# Patient Record
Sex: MALE | Race: WHITE | Employment: FULL TIME | URBAN - METROPOLITAN AREA
[De-identification: names, ages, dates, MRNs, and addresses within clinical notes are randomized per-mention and may not be internally consistent; named-entity substitution may affect disease eponyms.]

---

## 2017-05-09 ENCOUNTER — RADIANT APPOINTMENT (OUTPATIENT)
Dept: GENERAL RADIOLOGY | Facility: CLINIC | Age: 32
End: 2017-05-09
Attending: NURSE PRACTITIONER
Payer: COMMERCIAL

## 2017-05-09 ENCOUNTER — OFFICE VISIT (OUTPATIENT)
Dept: PEDIATRICS | Facility: CLINIC | Age: 32
End: 2017-05-09
Payer: COMMERCIAL

## 2017-05-09 VITALS
HEIGHT: 71 IN | BODY MASS INDEX: 35.07 KG/M2 | DIASTOLIC BLOOD PRESSURE: 60 MMHG | HEART RATE: 80 BPM | SYSTOLIC BLOOD PRESSURE: 104 MMHG | OXYGEN SATURATION: 97 % | WEIGHT: 250.5 LBS | TEMPERATURE: 97.6 F | RESPIRATION RATE: 18 BRPM

## 2017-05-09 DIAGNOSIS — J20.9 ACUTE BRONCHITIS, UNSPECIFIED ORGANISM: Primary | ICD-10-CM

## 2017-05-09 DIAGNOSIS — J20.9 ACUTE BRONCHITIS, UNSPECIFIED ORGANISM: ICD-10-CM

## 2017-05-09 PROCEDURE — 71020 XR CHEST 2 VW: CPT

## 2017-05-09 PROCEDURE — 99214 OFFICE O/P EST MOD 30 MIN: CPT | Mod: 25 | Performed by: NURSE PRACTITIONER

## 2017-05-09 PROCEDURE — 94640 AIRWAY INHALATION TREATMENT: CPT | Performed by: NURSE PRACTITIONER

## 2017-05-09 RX ORDER — ALBUTEROL SULFATE 90 UG/1
2 AEROSOL, METERED RESPIRATORY (INHALATION) EVERY 6 HOURS PRN
Qty: 1 INHALER | Refills: 0 | Status: SHIPPED | OUTPATIENT
Start: 2017-05-09 | End: 2017-11-02

## 2017-05-09 RX ORDER — AZITHROMYCIN 250 MG/1
TABLET, FILM COATED ORAL
Qty: 6 TABLET | Refills: 0 | Status: SHIPPED | OUTPATIENT
Start: 2017-05-09 | End: 2017-11-02

## 2017-05-09 NOTE — NURSING NOTE
"Chief Complaint   Patient presents with     URI     cold worsening       Initial /60 (Cuff Size: Adult Large)  Pulse 80  Temp 97.6  F (36.4  C) (Tympanic)  Resp 18  Ht 5' 11\" (1.803 m)  Wt 250 lb 8 oz (113.6 kg)  SpO2 97%  BMI 34.94 kg/m2 Estimated body mass index is 34.94 kg/(m^2) as calculated from the following:    Height as of this encounter: 5' 11\" (1.803 m).    Weight as of this encounter: 250 lb 8 oz (113.6 kg).  Medication Reconciliation: complete   Amarilys Pichardo, ОЛЬГА    "

## 2017-05-09 NOTE — PROGRESS NOTES
"  SUBJECTIVE:                                                    Shawn Martini is a 31 year old male who presents to clinic today for the following health issues:    Acute Illness   Acute illness concerns: cough getting worse & worse  Onset: 12 days    Fever: no     Chills/Sweats: YES- chills \"clammy\"    Headache (location?): YES    Sinus Pressure:YES    Conjunctivitis:  no    Ear Pain: no    Rhinorrhea: no     Congestion: no     Sore Throat: YES- feels it is from cough     Cough: YES-productive of clear sputum    Wheeze: YES    Decreased Appetite: no     Nausea: YES- intermittent    Vomiting: no    Diarrhea:  YES- from amoxicillin    Dysuria/Freq.: no    Fatigue/Achiness: YES- fatigue    Sick/Strep Exposure: no  Chest Congestion  Difficulty sleeping   Therapies Tried and outcome: amoxicillin - on day 5, allegra, cough syrup: Symptoms not alleviated    Seen [facility]. Diagnosed with sinusitis.   Had asthma as a child.  No personal or family history of blood clots.   shortness of breath and wheezing are his worst sx right now.     ROS: const/heent/resp/cv otherwise negative     OBJECTIVE:  /60 (Cuff Size: Adult Large)  Pulse 80  Temp 97.6  F (36.4  C) (Tympanic)  Resp 18  Ht 5' 11\" (1.803 m)  Wt 250 lb 8 oz (113.6 kg)  SpO2 97%  BMI 34.94 kg/m2  CONSTITUTIONAL: Alert, well-nourished, well-groomed, NAD  RESP: Lungs CTA. No wheeze, rhonchi, rales.  CV: HRRR S1 S2 No MRG. No peripheral edema  HEENT: Eyes: Conjunctiva pink and moist. Ears: Ear canals unremarkable. TMs pearly gray bilaterally. Bony landmarks and light reflexes intact. No erythema. Nose: Turbinates pink and moist. Throat: OP pink and moist. No tonsillar enlargement or exudates. No postnasal drip.  Neck: No lymphadenopathy or masses. Thyroid smooth, non-tender, and non-enlarged.    Subjective sx improved dramatically after neb.     ASSESSMENT/PLAN:  (J20.9) Acute bronchitis, unspecified organism  (primary encounter " diagnosis)  Comment: Sx consistent with bronchitis. Given duration of sx would like to treat with Azithromycin.   Plan: XR Chest 2 Views, ALBUTEROL UNIT DOSE, 1 MG,         INHALATION/NEBULIZER TREATMENT, INITIAL,         albuterol (PROAIR HFA/PROVENTIL HFA/VENTOLIN         HFA) 108 (90 BASE) MCG/ACT Inhaler,         azithromycin (ZITHROMAX) 250 MG tablet,         acetaminophen-codeine 120-12 MG/5ML suspension        Continue allergy meds.        Albuterol q 6 until feeling better. Azithromycin. Codeine cough syrup at hs.         Discussed supportive cares and reasons to return. Discussed reasons to seek care urgently.       Keshia Nuñez, AGNIESZKAP-DNP.

## 2017-05-09 NOTE — NURSING NOTE
The following nebulizer treatment was given:     MEDICATION: Albuterol Sulfate 2.5 mg  : [employer]  LOT #: [id number]  EXPIRATION DATE:  8/30/18  NDC # 6661-1933-19     Amarilys Pichardo CMA

## 2017-05-09 NOTE — MR AVS SNAPSHOT
"              After Visit Summary   2017    Shawn Martini    MRN: [id number]           Patient Information     Date Of Birth          1985        Visit Information        Provider Department      2017 9:20 AM Keshia Nuñez APRN CNP [facility] [place]        Today's Diagnoses     Acute bronchitis, unspecified organism    -  1       Follow-ups after your visit        Who to contact     If you have questions or need follow up information about today's clinic visit or your schedule please contact [facility] directly at [phone].  Normal or non-critical lab and imaging results will be communicated to you by eFuneralhart, letter or phone within 4 business days after the clinic has received the results. If you do not hear from us within 7 days, please contact the clinic through eFuneralhart or phone. If you have a critical or abnormal lab result, we will notify you by phone as soon as possible.  Submit refill requests through [email or web address] or call your pharmacy and they will forward the refill request to us. Please allow 3 business days for your refill to be completed.          Additional Information About Your Visit        MyChart Information     [email or web address] lets you send messages to your doctor, view your test results, renew your prescriptions, schedule appointments and more. To sign up, go to [email or web address] . Click on \"Log in\" on the left side of the screen, which will take you to the Welcome page. Then click on \"Sign up Now\" on the right side of the page.     You will be asked to enter the access code listed below, as well as some personal information. Please follow the directions to create your username and password.     Your access code is: [email or web address]  Expires: 2017 10:20 AM     Your access code will  in 90 days. If you need help or a new code, please call your [facility] or [phone].        Care EveryWhere ID     This is your Care EveryWhere ID. This could be " "used by other organizations to access your [place] medical records  [id number]        Your Vitals Were     Pulse Temperature Respirations Height Pulse Oximetry BMI (Body Mass Index)    80 97.6  F (36.4  C) (Tympanic) 18 5' 11\" (1.803 m) 97% 34.94 kg/m2       Blood Pressure from Last 3 Encounters:   05/09/17 104/60   09/15/16 110/74   09/12/16 110/70    Weight from Last 3 Encounters:   05/09/17 250 lb 8 oz (113.6 kg)   09/15/16 252 lb (114.3 kg)   09/12/16 255 lb (115.7 kg)              We Performed the Following     ALBUTEROL UNIT DOSE, 1 MG     INHALATION/NEBULIZER TREATMENT, INITIAL          Today's Medication Changes          These changes are accurate as of: 5/9/17 10:20 AM.  If you have any questions, ask your nurse or doctor.               Start taking these medicines.        Dose/Directions    acetaminophen-codeine 120-12 MG/5ML suspension   Used for:  Acute bronchitis, unspecified organism   Started by:  Keshia Nuñez APRN CNP        Dose:  15 mL   Take 15 mLs by mouth every 4 hours as needed for pain   Quantity:  270 mL   Refills:  0       albuterol 108 (90 BASE) MCG/ACT Inhaler   Commonly known as:  PROAIR HFA/PROVENTIL HFA/VENTOLIN HFA   Used for:  Acute bronchitis, unspecified organism   Started by:  Keshia Nuñez APRN CNP        Dose:  2 puff   Inhale 2 puffs into the lungs every 6 hours as needed for shortness of breath / dyspnea or wheezing   Quantity:  1 Inhaler   Refills:  0       azithromycin 250 MG tablet   Commonly known as:  ZITHROMAX   Used for:  Acute bronchitis, unspecified organism   Started by:  Keshia Nuñez APRN CNP        Two tablets first day, then one tablet daily for four days.   Quantity:  6 tablet   Refills:  0            Where to get your medicines      These medications were sent to [facility] - [address] [place] [address] [address]     Phone:  [phone]     albuterol 108 " (90 BASE) MCG/ACT Inhaler    azithromycin 250 MG tablet         Some of these will need a paper prescription and others can be bought over the counter.  Ask your nurse if you have questions.     Bring a paper prescription for each of these medications     acetaminophen-codeine 120-12 MG/5ML suspension                Primary Care Provider    None       No address on file        Thank you!     Thank you for choosing [facility]  for your care. Our goal is always to provide you with excellent care. Hearing back from our patients is one way we can continue to improve our services. Please take a few minutes to complete the written survey that you may receive in the mail after your visit with us. Thank you!             Your Updated Medication List - Protect others around you: Learn how to safely use, store and throw away your medicines at www.disposemymeds.org.          This list is accurate as of: 5/9/17 10:20 AM.  Always use your most recent med list.                   Brand Name Dispense Instructions for use    acetaminophen-codeine 120-12 MG/5ML suspension     270 mL    Take 15 mLs by mouth every 4 hours as needed for pain       albuterol 108 (90 BASE) MCG/ACT Inhaler    PROAIR HFA/PROVENTIL HFA/VENTOLIN HFA    1 Inhaler    Inhale 2 puffs into the lungs every 6 hours as needed for shortness of breath / dyspnea or wheezing       azithromycin 250 MG tablet    ZITHROMAX    6 tablet    Two tablets first day, then one tablet daily for four days.

## 2017-11-02 ENCOUNTER — OFFICE VISIT (OUTPATIENT)
Dept: PEDIATRICS | Facility: CLINIC | Age: 32
End: 2017-11-02
Payer: COMMERCIAL

## 2017-11-02 VITALS
BODY MASS INDEX: 35.91 KG/M2 | HEIGHT: 71 IN | HEART RATE: 85 BPM | TEMPERATURE: 98.9 F | OXYGEN SATURATION: 99 % | WEIGHT: 256.5 LBS | SYSTOLIC BLOOD PRESSURE: 110 MMHG | DIASTOLIC BLOOD PRESSURE: 76 MMHG

## 2017-11-02 DIAGNOSIS — G44.209 TENSION HEADACHE: Primary | ICD-10-CM

## 2017-11-02 PROCEDURE — 99214 OFFICE O/P EST MOD 30 MIN: CPT | Mod: GC | Performed by: STUDENT IN AN ORGANIZED HEALTH CARE EDUCATION/TRAINING PROGRAM

## 2017-11-02 RX ORDER — CYCLOBENZAPRINE HCL 10 MG
10 TABLET ORAL AT BEDTIME
Qty: 10 TABLET | Refills: 0 | Status: SHIPPED | OUTPATIENT
Start: 2017-11-02 | End: 2017-11-12

## 2017-11-02 NOTE — PROGRESS NOTES
"  SUBJECTIVE:   Shawn Martini is a 32 year old male who presents to clinic today for the following health issues:      Headaches      Duration: 5 days, since Sunday. Friday night turned in his bed and felt like he \"tore a neck muscle\" or popped something in his neck. Had some soreness in his neck on Saturday, and then headache started on Sunday, at the back of his head near where he felt like he injured it on Friday.    Description  Location: bilateral in the occipital area   Character: sharp and throbbing pain  Frequency: nightly for last 5 nights  Duration: lasts all night and improves as he wakes up and becomes more active    Intensity:  5-6/10 at night time, better upon waking. Several hours after waking is 3/10 in severity.    Accompanying signs and symptoms: Mild sensitivity to light and sound, no other accompanying s/sx.     Precipitating or Alleviating factors:  Nausea/vomiting: no  Dizziness: no  Weakness or numbness: no  Visual changes: no  Fever: no   Sinus or URI symptoms no     History  Head trauma: no   Family history of migraines: no   Previous tests for headaches: had headache and ?fever May 2017, CT scan normal at [place]. Headache is different in quality this time.  Neurologist evaluations: no   Able to do daily activities when headache present: yes  Wake with headaches: yes, as the headaches last through the night  Daily pain medication use: yes, taking ibuprofen 800mg qHS, seems to help  Changes in: no changes in diet, caffeine intake, sleep pattern, or stressors    Precipitating or Alleviating factors (light/sound/sleep/caffeine): no    Therapies tried and outcome: Ibuprofen (Advil, Motrin) 800 mg qHS,  Outcome - effective. Warm pack on neck one time, not sure if helped.  Frequent/daily pain medication use: not in the past, only over the last 5 days. Does not taking other medications.    Problem list and histories reviewed & adjusted, as indicated.  Additional history: as " "documented    Patient Active Problem List   Diagnosis     CARDIOVASCULAR SCREENING; LDL GOAL LESS THAN 160     Past Surgical History:   Procedure Laterality Date     ORTHOPEDIC SURGERY Right 2003    Right knee, ACL/R     SURGICAL HISTORY OF -  Left 2003    Left wrist, ganglion cyst excision     WRIST SURGERY      cyst removal       Social History   Substance Use Topics     Smoking status: Never Smoker     Smokeless tobacco: Never Used     Alcohol use No      Comment: rare     Family History   Problem Relation Age of Onset     Family History Negative Mother      Family History Negative Father          Current Outpatient Prescriptions   Medication Sig Dispense Refill     cyclobenzaprine (FLEXERIL) 10 MG tablet Take 1 tablet (10 mg) by mouth At Bedtime for 10 doses 10 tablet 0     No Known Allergies  BP Readings from Last 3 Encounters:   11/02/17 110/76   05/09/17 104/60   09/15/16 110/74    Wt Readings from Last 3 Encounters:   11/02/17 256 lb 8 oz (116.3 kg)   05/09/17 250 lb 8 oz (113.6 kg)   09/15/16 252 lb (114.3 kg)                    ROS:  C: NEGATIVE for fever, chills, change in weight  I: NEGATIVE for worrisome rashes, moles or lesions  E: NEGATIVE for vision changes or irritation  E/M: NEGATIVE for ear, mouth and throat problems  R: NEGATIVE for significant cough or SOB  CV: NEGATIVE for chest pain, palpitations or peripheral edema  GI: NEGATIVE for nausea, abdominal pain, heartburn, or change in bowel habits  : NEGATIVE for frequency, dysuria, or hematuria  M: NEGATIVE for significant arthralgias or myalgia  N: NEGATIVE for weakness, dizziness or paresthesias, POSITIVE for headaches  E: NEGATIVE for temperature intolerance, skin/hair changes  H: NEGATIVE for bleeding problems  P: NEGATIVE for changes in mood or affect    OBJECTIVE:     /76 (BP Location: Right arm, Patient Position: Chair, Cuff Size: Adult Large)  Pulse 85  Temp 98.9  F (37.2  C) (Oral)  Ht 5' 11\" (1.803 m)  Wt 256 lb 8 oz (116.3 " kg)  SpO2 99%  BMI 35.77 kg/m2  Body mass index is 35.77 kg/(m^2).  Physical Exam    General: awake, alert, in no acute distress  HEENT: NCAT, PERRL, EOMI, sclera anicteric, no nasal discharge, MMM, posterior pharynx without erythema or exudates, no cervical lymphadenopathy  CV: RRR, no murmurs noted, peripheral pulses strong  Resp: CTAB, no increased WOB  Abd: Soft, nontender, nondistended, +BS, no rebound or guarding  MSK: No peripheral edema, extremities warm and well perfused, normal pulses  Skin: warm, dry, no jaundice  Neuro: CN II-XII grossly intact. 5/5 upper extremity and lower extremity strength. No numbness or changes in sensation. No focal deficits. Alert and oriented x4.    Diagnostic Test Results:  none     ASSESSMENT/PLAN:     1. Tension headache  Symptoms consistent with tension headache especially with history of preceding neck strain and better with activity throughout the day. No neurologic changes or other symptoms concerning for intracranial process including a mass or hemorrhage (no weakness, numbness, tingling, nausea, vomiting, vision changes). Normal head CT May 2017. Blood pressure normal. Less likely but could also consider pseudotumor cerebri given pain worse when laying down and patient overweight but less likely in male patient and does not have other s/sx consistent with this.   - Conservative management  - cyclobenzaprine (FLEXERIL) 10 MG tablet; Take 1 tablet (10 mg) by mouth At Bedtime for 10 doses  Dispense: 10 tablet; Refill: 0  - warm pack on neck  - Tylenol, ibuprofen for pain but try to limit use due to risk of rebound headache  - neck exercises on AVS  - RTC if not better over the weekend, will consider imaging. Given reasons to go to the ED.    MEDICATIONS:        - Start taking Flexeril  FUTURE APPOINTMENTS:       - Follow-up for annual visit or as needed if symptoms do not improve.    Patient was seen and discussed with attending, Dr. Keshia Larios, who agrees with the  above assessment and plan.    Coleen Calhoun MD  PGY - 2   Internal Medicine/Pediatrics  Pager [phone]  [facility]    Attestation:    I have seen patient and reviewed the documentation from Dr. Calhoun and discussed the findings with Dr. Calhoun. I agree with the documentation of Dr. Calhoun.    Keshia Larios MD  Internal Medicine/Pediatrics  [facility]

## 2017-11-02 NOTE — PATIENT INSTRUCTIONS
"1. Flexeril 10 mg nightly for muscle strain that may be causing your tension headache.  2. Continue warm pack on neck, might help with the strain.  3. Try taking Tylenol instead of ibuprofen or alternating these two to prevent rebound headache.  4. Gentle neck exercises.  5. If symptoms aren't better by Monday, would consider coming back to clinic and considering imaging. If you have changes in your symptoms or they are getting worse (fevers, vision changes, nausea/vomiting, weakness, numbness, difficulty speaking, etc) I suggest going to the ER.     * Tension Headache    Muscle Tension Headache (also called \"stress headache\") is a very common cause of head pain. Under stress, some people tense the muscles of their shoulder, neck and scalp without knowing it. If this lasts long enough, a headache can occur. These headaches can be very painful and last for hours or even days.  Home Care:    If you were given pain medicine for this headache, do not drive yourself home. Arrange for a ride, instead. When you get home, try to sleep. You should feel much better when you wake up.    Heat to the back of your neck may relieve neck spasm.    Drink only clear liquids or eat a very light diet to avoid nausea/vomiting until symptoms improve.  Preventing Future Headaches    Identify the sources of stress in your life. These may not be obvious! Learn new ways to handle your stress, such as regular exercise, biofeedback, self-hypnosis and meditation. For more information about this, consult your doctor or go to a local bookstore and review the many books and tapes on this subject.    At the first sign of a tension headache, take time out if possible. Remove yourself from the stressful situation, find a quiet comfortable place to sit or lie down and let yourself relax. Heat and deep massage of the tight areas in the neck and shoulders may help reduce muscle spasm. Medicine, such as ibuprofen (Advil or Motrin) or a prescribed muscle " relaxant may be helpful at this point.  Follow Up with your doctor if the headache is not better within the next 24 hours. If you have frequent headaches you should discuss a treatment plan with your primary care doctor. Ask if you can have medicine to take at home the next time you get a bad headache. This may avoid the need for a visit to the emergency department in the future. Poorly controlled chronic headaches may require a referral to a neurologist (headache specialist).  Call your Doctor Or Get Prompt Medical Attention if any of the following occur:    Worsening of your head pain or no improvement within 24 hours    Repeated vomiting (unable to keep liquids down)    Fever of 100.4 F (38.0 C) or higher, or as directed by your healthcare provider    Stiff neck    Extreme drowsiness, confusion or fainting    Dizziness, vertigo (dizziness with spinning sensation)    Weakness of an arm or leg or one side of the face    Difficulty with speech or vision    9374-8582 [employer]. [address] [place] [address]. All rights reserved. This information is not intended as a substitute for professional medical care. Always follow your healthcare professional's instructions.  This information has been modified by your health care provider with permission from the publisher.                      Neck Strain             What is neck strain?   A strain is a tear of a muscle or tendon. Your neck is surrounded by small muscles that are close to the vertebrae, and larger muscles that make up the visible muscles of the neck.   How does it occur?   Neck strains most often happen when the head and neck are forcibly moved, such as in a whiplash injury or from contact in sports. Sometimes strains happen from an awkward position during sleep or poor posture while working at a computer.   What are the symptoms?   Symptoms include pain in your neck. When the neck muscles go into spasm you feel hard, tight muscles  in your neck that are very tender to the touch. You have pain when you move your head to the side or when you try to move your head up or down. The spasming muscles can cause headaches.   The pain may start right after an injury or may take a few hours or days to develop. Other symptoms may include neck stiffness, dizziness, or unusual sensations, such as burning or a pins-and-needles feeling.   How is it diagnosed?   Your healthcare provider will examine your neck. You may have X-rays to make sure the vertebrae are not injured.   How is it treated?   Right after the injury, put an ice pack, gel pack, or package of frozen vegetables, wrapped in a cloth on the area every 3 to 4 hours, for up to 20 minutes at a time.   If you still have neck pain several days after the injury and after using ice, your healthcare provider may recommend using moist heat on your neck. You can buy a moist-heat pad or make your own by soaking towels in hot water. Put moist heat on your neck for up to 20 minutes at a time every 3 or 4 hours until the pain goes away. You may find that it helps to alternate putting heat and ice on your neck.   Your healthcare provider may prescribe an anti-inflammatory medicine and a neck collar to support your neck and prevent further injury. Nonsteroidal anti-inflammatory medicines (NSAIDs) may cause stomach bleeding and other problems. These risks increase with age. Read the label and take as directed. Unless recommended by your healthcare provider, do not take for more than 10 days.   Follow your provider's instructions for doing exercises to help you recover.   How long will the effects last?   How long it takes to recover depends on your age, health, and if you have had a previous neck injury. Recovery time also depends on the severity of the injury. A mild injury may recover within a few weeks, whereas a severe injury may take 6 weeks or longer to recover. Ask your healthcare provider when you can  return to your normal activities.   How can I prevent neck strain?   Neck strain is best prevented by having strong and supple neck muscles. If you have a job that requires you to be in one position all day (for example, work at a computer all day), it is very important to take breaks and stretch your neck muscles. Your provider will give you exercises to do while taking breaks from work.     Published by [employer].  This content is reviewed periodically and is subject to change as new health information becomes available. The information is intended to inform and educate and is not a replacement for medical evaluation, advice, diagnosis or treatment by a healthcare professional.   Written by Adiel Angulo MD, for [employer].   ? 2010 [employer] and/or its affiliates. All Rights Reserved.   Copyright   [employer] 2011  Adult Health Advisor                    Neck Strain Rehabilitation Exercises                Do these exercises only if you do not have pain or numbness running down your arm or into your hand. The first 6 exercises are meant to help your neck remain flexible. Do not do any exercises that make your neck pain worse.   Active neck rotation: Sit in a chair, keeping your neck, shoulders, and trunk straight. First, turn your head slowly to the right. Move it gently to the point of pain. Move it back to the forward position. Relax. Then move it to the left. Repeat 10 times.   Active neck side bend: Sit in a chair, keeping your neck, shoulders, and trunk straight. Tilt your head so that your right ear moves toward your right shoulder. Move it to the point of pain. Then tilt your head so your left ear moves toward your left shoulder. Make sure you do not rotate your head while tilting or raise your shoulder toward your head. Repeat this exercise 10 times in each direction.   Neck flexion: Sit in a chair, keeping your neck, shoulders, and trunk straight. Bend your head forward, reaching  your chin toward your chest. Hold for 5 seconds. Repeat 10 times.   Neck extension: Sit in a chair, keeping your neck, shoulders, and trunk straight. Bring your head back so that your chin is pointing toward the ceiling. Repeat 10 times.   Chin tuck: Place your fingertips on your chin and gently push your head straight back as if you are trying to make a double chin. Keep looking forward as your head moves back. Hold 5 seconds and repeat 5 times.   Scalene stretch: Sit or stand and clasp both hands behind your back. Lower your left shoulder and tilt your head toward the right until you feel a stretch. Hold this position for 15 to 30 seconds and then come back to the starting position. Then lower your right shoulder and tilt your head toward the left. Hold for 15 to 30 seconds. Repeat 3 times on each side.   Isometric neck flexion: Sit tall, eyes straight ahead, and chin level. Place your palm against your forehead and gently push your forehead into your palm. Hold for 5 seconds and release. Do 3 sets of 5.   Isometric neck extension: Sit tall, eyes straight ahead, and chin level. Clasp your hands together and place them behind your head. Press the back of your head into your palms. Hold 5 seconds and release. Do 3 sets of 5.   Isometric neck side bend: Sit tall, eyes straight ahead, and chin level. Place the palm of your hand at the side of your temple and press your temple into the palm of your hand. Hold 5 seconds and release. Do 3 sets of 5 on each side.   Head lift: Neck curl: Lie on your back with your knees bent and your feet flat on the floor. Tuck your chin and lift your head toward your chest, keeping your shoulders on the floor. Hold for 5 seconds. Repeat 10 times.   Head lift: Neck side bend: Lie on your right side with your right arm lying straight out. Rest your head on your arm, then lift your head slowly toward your left shoulder. Hold for 5 seconds. Repeat 10 times. Switch to your left side and  repeat the exercise, lifting your head toward your right shoulder.   Neck extension on hands and knees: Get on your hands and knees and look down at the floor. Keep your back straight and let your head slowly drop toward your chest. Then tuck your chin slightly and lift your head up until your neck is level with your back. Hold this position for 5 seconds. Repeat 10 times.   Scapular squeeze: While sitting or standing with your arms by your sides, squeeze your shoulder blades together and hold for 5 seconds. Do 3 sets of 10.   Published by [employer].  This content is reviewed periodically and is subject to change as new health information becomes available. The information is intended to inform and educate and is not a replacement for medical evaluation, advice, diagnosis or treatment by a healthcare professional.   Written by Sari Navarrete MS, PT, and Aga Stewart PT, [facility], [facility], for [employer].   ? 2010 [facility] and/or its affiliates. All Rights Reserved.   Copyright   [employer] 2011  Adult Health Advisor

## 2017-11-02 NOTE — MR AVS SNAPSHOT
"              After Visit Summary   11/2/2017    Shawn Martini    MRN: [id number]           Patient Information     Date Of Birth          1985        Visit Information        Provider Department      11/2/2017 8:30 AM [facility] Cal[facility]oun [facility]        Today's Diagnoses     Tension headache    -  1      Care Instructions    1. Flexeril 10 mg nightly for muscle strain that may be causing your tension headache.  2. Continue warm pack on neck, might help with the strain.  3. Try taking Tylenol instead of ibuprofen or alternating these two to prevent rebound headache.  4. Gentle neck exercises.  5. If symptoms aren't better by Monday, would consider coming back to clinic and considering imaging. If you have changes in your symptoms or they are getting worse (fevers, vision changes, nausea/vomiting, weakness, numbness, difficulty speaking, etc) I suggest going to the ER.     * Tension Headache    Muscle Tension Headache (also called \"stress headache\") is a very common cause of head pain. Under stress, some people tense the muscles of their shoulder, neck and scalp without knowing it. If this lasts long enough, a headache can occur. These headaches can be very painful and last for hours or even days.  Home Care:    If you were given pain medicine for this headache, do not drive yourself home. Arrange for a ride, instead. When you get home, try to sleep. You should feel much better when you wake up.    Heat to the back of your neck may relieve neck spasm.    Drink only clear liquids or eat a very light diet to avoid nausea/vomiting until symptoms improve.  Preventing Future Headaches    Identify the sources of stress in your life. These may not be obvious! Learn new ways to handle your stress, such as regular exercise, biofeedback, self-hypnosis and meditation. For more information about this, consult your doctor or go to a local bookstore and review the many books and tapes on this subject.    At " the first sign of a tension headache, take time out if possible. Remove yourself from the stressful situation, find a quiet comfortable place to sit or lie down and let yourself relax. Heat and deep massage of the tight areas in the neck and shoulders may help reduce muscle spasm. Medicine, such as ibuprofen (Advil or Motrin) or a prescribed muscle relaxant may be helpful at this point.  Follow Up with your doctor if the headache is not better within the next 24 hours. If you have frequent headaches you should discuss a treatment plan with your primary care doctor. Ask if you can have medicine to take at home the next time you get a bad headache. This may avoid the need for a visit to the emergency department in the future. Poorly controlled chronic headaches may require a referral to a neurologist (headache specialist).  Call your Doctor Or Get Prompt Medical Attention if any of the following occur:    Worsening of your head pain or no improvement within 24 hours    Repeated vomiting (unable to keep liquids down)    Fever of 100.4 F (38.0 C) or higher, or as directed by your healthcare provider    Stiff neck    Extreme drowsiness, confusion or fainting    Dizziness, vertigo (dizziness with spinning sensation)    Weakness of an arm or leg or one side of the face    Difficulty with speech or vision    2766-1602 [employer]. [address] [place] [address], [place] [id number]. All rights reserved. This information is not intended as a substitute for professional medical care. Always follow your healthcare professional's instructions.  This information has been modified by your health care provider with permission from the publisher.                      Neck Strain             What is neck strain?   A strain is a tear of a muscle or tendon. Your neck is surrounded by small muscles that are close to the vertebrae, and larger muscles that make up the visible muscles of the neck.   How does it occur?   Neck strains  most often happen when the head and neck are forcibly moved, such as in a whiplash injury or from contact in sports. Sometimes strains happen from an awkward position during sleep or poor posture while working at a computer.   What are the symptoms?   Symptoms include pain in your neck. When the neck muscles go into spasm you feel hard, tight muscles in your neck that are very tender to the touch. You have pain when you move your head to the side or when you try to move your head up or down. The spasming muscles can cause headaches.   The pain may start right after an injury or may take a few hours or days to develop. Other symptoms may include neck stiffness, dizziness, or unusual sensations, such as burning or a pins-and-needles feeling.   How is it diagnosed?   Your healthcare provider will examine your neck. You may have X-rays to make sure the vertebrae are not injured.   How is it treated?   Right after the injury, put an ice pack, gel pack, or package of frozen vegetables, wrapped in a cloth on the area every 3 to 4 hours, for up to 20 minutes at a time.   If you still have neck pain several days after the injury and after using ice, your healthcare provider may recommend using moist heat on your neck. You can buy a moist-heat pad or make your own by soaking towels in hot water. Put moist heat on your neck for up to 20 minutes at a time every 3 or 4 hours until the pain goes away. You may find that it helps to alternate putting heat and ice on your neck.   Your healthcare provider may prescribe an anti-inflammatory medicine and a neck collar to support your neck and prevent further injury. Nonsteroidal anti-inflammatory medicines (NSAIDs) may cause stomach bleeding and other problems. These risks increase with age. Read the label and take as directed. Unless recommended by your healthcare provider, do not take for more than 10 days.   Follow your provider's instructions for doing exercises to help you  recover.   How long will the effects last?   How long it takes to recover depends on your age, health, and if you have had a previous neck injury. Recovery time also depends on the severity of the injury. A mild injury may recover within a few weeks, whereas a severe injury may take 6 weeks or longer to recover. Ask your healthcare provider when you can return to your normal activities.   How can I prevent neck strain?   Neck strain is best prevented by having strong and supple neck muscles. If you have a job that requires you to be in one position all day (for example, work at a computer all day), it is very important to take breaks and stretch your neck muscles. Your provider will give you exercises to do while taking breaks from work.     Published by [employer].  This content is reviewed periodically and is subject to change as new health information becomes available. The information is intended to inform and educate and is not a replacement for medical evaluation, advice, diagnosis or treatment by a healthcare professional.   Written by Adiel Angulo MD, for [employer].   ? 2010 [employer] and/or its affiliates. All Rights Reserved.   Copyright   [employer] 2011  Adult Health Advisor                    Neck Strain Rehabilitation Exercises                Do these exercises only if you do not have pain or numbness running down your arm or into your hand. The first 6 exercises are meant to help your neck remain flexible. Do not do any exercises that make your neck pain worse.   Active neck rotation: Sit in a chair, keeping your neck, shoulders, and trunk straight. First, turn your head slowly to the right. Move it gently to the point of pain. Move it back to the forward position. Relax. Then move it to the left. Repeat 10 times.   Active neck side bend: Sit in a chair, keeping your neck, shoulders, and trunk straight. Tilt your head so that your right ear moves toward your right shoulder.  Move it to the point of pain. Then tilt your head so your left ear moves toward your left shoulder. Make sure you do not rotate your head while tilting or raise your shoulder toward your head. Repeat this exercise 10 times in each direction.   Neck flexion: Sit in a chair, keeping your neck, shoulders, and trunk straight. Bend your head forward, reaching your chin toward your chest. Hold for 5 seconds. Repeat 10 times.   Neck extension: Sit in a chair, keeping your neck, shoulders, and trunk straight. Bring your head back so that your chin is pointing toward the ceiling. Repeat 10 times.   Chin tuck: Place your fingertips on your chin and gently push your head straight back as if you are trying to make a double chin. Keep looking forward as your head moves back. Hold 5 seconds and repeat 5 times.   Scalene stretch: Sit or stand and clasp both hands behind your back. Lower your left shoulder and tilt your head toward the right until you feel a stretch. Hold this position for 15 to 30 seconds and then come back to the starting position. Then lower your right shoulder and tilt your head toward the left. Hold for 15 to 30 seconds. Repeat 3 times on each side.   Isometric neck flexion: Sit tall, eyes straight ahead, and chin level. Place your palm against your forehead and gently push your forehead into your palm. Hold for 5 seconds and release. Do 3 sets of 5.   Isometric neck extension: Sit tall, eyes straight ahead, and chin level. Clasp your hands together and place them behind your head. Press the back of your head into your palms. Hold 5 seconds and release. Do 3 sets of 5.   Isometric neck side bend: Sit tall, eyes straight ahead, and chin level. Place the palm of your hand at the side of your temple and press your temple into the palm of your hand. Hold 5 seconds and release. Do 3 sets of 5 on each side.   Head lift: Neck curl: Lie on your back with your knees bent and your feet flat on the floor. Tuck your chin  and lift your head toward your chest, keeping your shoulders on the floor. Hold for 5 seconds. Repeat 10 times.   Head lift: Neck side bend: Lie on your right side with your right arm lying straight out. Rest your head on your arm, then lift your head slowly toward your left shoulder. Hold for 5 seconds. Repeat 10 times. Switch to your left side and repeat the exercise, lifting your head toward your right shoulder.   Neck extension on hands and knees: Get on your hands and knees and look down at the floor. Keep your back straight and let your head slowly drop toward your chest. Then tuck your chin slightly and lift your head up until your neck is level with your back. Hold this position for 5 seconds. Repeat 10 times.   Scapular squeeze: While sitting or standing with your arms by your sides, squeeze your shoulder blades together and hold for 5 seconds. Do 3 sets of 10.   Published by [employer].  This content is reviewed periodically and is subject to change as new health information becomes available. The information is intended to inform and educate and is not a replacement for medical evaluation, advice, diagnosis or treatment by a healthcare professional.   Written by Sari Navarrete, MS, PT, and Aga Stewart, PT, [facility], \[facility] [place] [facility]\"", for [employer].   ? 2010 [employer] and/or its affiliates. All Rights Reserved.   Copyright   [employer] 2011  Adult Health Advisor                                 Follow-ups after your visit        Who to contact     If you have questions or need follow up information about today's clinic visit or your schedule please contact [facility] directly at [phone].  Normal or non-critical lab and imaging results will be communicated to you by MyChart, letter or phone within 4 business days after the clinic has received the results. If you do not hear from us within 7 days, please contact the clinic through MyChart or phone. If you have a critical or abnormal lab  "result, we will notify you by phone as soon as possible.  Submit refill requests through [employer] or call your pharmacy and they will forward the refill request to us. Please allow 3 business days for your refill to be completed.          Additional Information About Your Visit        [employer] Information     [employer] lets you send messages to your doctor, view your test results, renew your prescriptions, schedule appointments and more. To sign up, go to [email or web address] . Click on \"Log in\" on the left side of the screen, which will take you to the Welcome page. Then click on \"Sign up Now\" on the right side of the page.     You will be asked to enter the access code listed below, as well as some personal information. Please follow the directions to create your username and password.     Your access code is: [email or web address]  Expires: 2018  9:15 AM     Your access code will  in 90 days. If you need help or a new code, please call your [facility] clinic or [phone].        Care EveryWhere ID     This is your Care EveryWhere ID. This could be used by other organizations to access your [facility] medical records  [id number]        Your Vitals Were     Pulse Temperature Height Pulse Oximetry BMI (Body Mass Index)       85 98.9  F (37.2  C) (Oral) 5' 11\" (1.803 m) 99% 35.77 kg/m2        Blood Pressure from Last 3 Encounters:   17 110/76   17 104/60   09/15/16 110/74    Weight from Last 3 Encounters:   17 256 lb 8 oz (116.3 kg)   17 250 lb 8 oz (113.6 kg)   09/15/16 252 lb (114.3 kg)              Today, you had the following     No orders found for display         Today's Medication Changes          These changes are accurate as of: 17  9:15 AM.  If you have any questions, ask your nurse or doctor.               Start taking these medicines.        Dose/Directions    cyclobenzaprine 10 MG tablet   Commonly known as:  FLEXERIL   Used for:  Tension headache   Started by:  Brian Calhoun " MD Maribel        Dose:  10 mg   Take 1 tablet (10 mg) by mouth At Bedtime for 10 doses   Quantity:  10 tablet   Refills:  0            Where to get your medicines      These medications were sent to [facility] - [address]   [address], [place] [address]     Phone:  [phone]     cyclobenzaprine 10 MG tablet                Primary Care Provider Office Phone # Fax #    [facility] [phone]       [address]  [place] [address]        Equal Access to Services     [facility]: Hadii aad ku hadasho Soomaali, waaxda luqadaha, qaybta kaalmada adeegyada, waxay idiin hayaan kathie kuhn . So [facility] [phone].    ATENCIÓN: Si habla español, tiene a james disposición servicios gratuitos de asistencia lingüística. Llame al [phone].    We comply with applicable federal civil rights laws and [place] laws. We do not discriminate on the basis of race, color, national origin, age, disability, sex, sexual orientation, or gender identity.            Thank you!     Thank you for choosing [facility]  for your care. Our goal is always to provide you with excellent care. Hearing back from our patients is one way we can continue to improve our services. Please take a few minutes to complete the written survey that you may receive in the mail after your visit with us. Thank you!             Your Updated Medication List - Protect others around you: Learn how to safely use, store and throw away your medicines at www.disposemymeds.org.          This list is accurate as of: 11/2/17  9:15 AM.  Always use your most recent med list.                   Brand Name Dispense Instructions for use Diagnosis    cyclobenzaprine 10 MG tablet    FLEXERIL    10 tablet    Take 1 tablet (10 mg) by mouth At Bedtime for 10 doses    Tension headache

## 2017-11-02 NOTE — NURSING NOTE
"Chief Complaint   Patient presents with     Headache       Initial /76 (BP Location: Right arm, Patient Position: Chair, Cuff Size: Adult Large)  Pulse 85  Temp 98.9  F (37.2  C) (Oral)  Ht 5' 11\" (1.803 m)  Wt 256 lb 8 oz (116.3 kg)  SpO2 99%  BMI 35.77 kg/m2 Estimated body mass index is 35.77 kg/(m^2) as calculated from the following:    Height as of this encounter: 5' 11\" (1.803 m).    Weight as of this encounter: 256 lb 8 oz (116.3 kg).  Medication Reconciliation: complete   Anai Mitchell MA      "

## 2017-11-28 ENCOUNTER — OFFICE VISIT (OUTPATIENT)
Dept: PEDIATRICS | Facility: CLINIC | Age: 32
End: 2017-11-28
Payer: COMMERCIAL

## 2017-11-28 VITALS
HEART RATE: 84 BPM | BODY MASS INDEX: 36.15 KG/M2 | DIASTOLIC BLOOD PRESSURE: 80 MMHG | WEIGHT: 258.2 LBS | SYSTOLIC BLOOD PRESSURE: 102 MMHG | HEIGHT: 71 IN | TEMPERATURE: 98.1 F | OXYGEN SATURATION: 98 %

## 2017-11-28 DIAGNOSIS — G44.209 TENSION HEADACHE: Primary | ICD-10-CM

## 2017-11-28 PROCEDURE — 96372 THER/PROPH/DIAG INJ SC/IM: CPT | Performed by: PHYSICIAN ASSISTANT

## 2017-11-28 PROCEDURE — 99214 OFFICE O/P EST MOD 30 MIN: CPT | Mod: 25 | Performed by: PHYSICIAN ASSISTANT

## 2017-11-28 RX ORDER — KETOROLAC TROMETHAMINE 30 MG/ML
60 INJECTION, SOLUTION INTRAMUSCULAR; INTRAVENOUS ONCE
Qty: 2 ML | Refills: 0 | OUTPATIENT
Start: 2017-11-28 | End: 2017-11-28

## 2017-11-28 NOTE — NURSING NOTE
The following medication was given:     MEDICATION: Ketorolac Tromethamine 60MG/2ML (30 mg/mL) (Toradol)  ROUTE: IM  SITE: Ventrogluteal - Left  DOSE: 2 mL  LOT #: [id number]  :  [employer]  EXPIRATION DATE:  02/2018  NDC#: [id number]  //Arpita Long MA// November 28, 2017 12:21 PM

## 2017-11-28 NOTE — NURSING NOTE
"Chief Complaint   Patient presents with     Headache     Back Pain       Initial /80  Pulse 84  Temp 98.1  F (36.7  C) (Oral)  Ht 5' 11\" (1.803 m)  Wt 258 lb 3.2 oz (117.1 kg)  SpO2 98%  BMI 36.01 kg/m2 Estimated body mass index is 36.01 kg/(m^2) as calculated from the following:    Height as of this encounter: 5' 11\" (1.803 m).    Weight as of this encounter: 258 lb 3.2 oz (117.1 kg).  Medication Reconciliation: complete   Arpita Long MA// November 28, 2017 8:44 AM          "

## 2017-11-28 NOTE — PROGRESS NOTES
"  SUBJECTIVE:   Shawn Martini is a 32 year old male who presents to clinic today for the following health issues:    Headache  Onset: on going on and off worst in the last two days     Description:   Location: bilateral in the top area, bilateral in the occipital area   Character: dull pain, squeezing pain  Frequency:  Constant in the last 24 hrs  Usually comes and goes   Duration:  4-5 hrs usually  Constant currently     Intensity: severe; 6/10    Progression of Symptoms:  worsening    Accompanying Signs & Symptoms:  Stiff neck: no    Neck or upper back pain: no - just middle; Below shoulder blades  Ongoing x one week; worse in the last 2 days    Fever: no  Sinus pressure: no  Nausea or vomiting: no  Dizziness: no  Numbness: no  Weakness: no  Visual changes: no    History:   Head trauma: no  Family history of migraines: no  Previous tests for headaches: CT head in spring 2017  Neurologist evaluations: no  Able to do daily activities: YES--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  Wake with a headaches: YES- went to bed and woke up   Do headaches wake you up: YES  Daily pain medication use: no  Work/school stressors/changes: no    Precipitating factors:   Does light make it worse: being in the dark has help   Does sound make it worse: hard to say     Alleviating factors:  Does sleep help: no  Therapies Tried and outcome: took percocet last night, ibuprofen this morning     ROS:  C: NEGATIVE for fever, chills  E/M: NEGATIVE for ear, mouth and throat problems  R: NEGATIVE for significant cough or SOB  CV: NEGATIVE for chest pain, palpitations  GI: NEGATIVE for nausea, abdominal pain, heartburn, or change in bowel habits  NEURO: NEGATIVE for weakness, dizziness or paresthesias    OBJECTIVE:                                                    /80  Pulse 84  Temp 98.1  F (36.7  C) (Oral)  Ht 5' 11\" (1.803 m)  Wt 258 lb 3.2 oz (117.1 kg)  SpO2 98%  BMI 36.01 kg/m2  Body mass index is 36.01 kg/(m^2). "   GENERAL: alert, no distress  Eyes:  Eyes grossly normal to inspection, PERRL and conjunctivae and sclerae normal  HENT: Mouth- no ulcers, no lesions  NECK: posterior neck pain; full ROM and strength  RESP: lungs clear to auscultation - no rales, no rhonchi, no wheezes  CV: regular rates and rhythm, normal S1 S2, no S3 or S4 and no murmur, no click or rub  Neuro:  Normal strength and tone, mentation intact, speech normal and cranial nerves 2-12 intact    Diagnostic test results:  No results found for this or any previous visit (from the past 24 hour(s)).       ASSESSMENT/PLAN:                                                    (G44.209) Tension headache  (primary encounter diagnosis)  Comment: heat, gentle ROM, aleve twice daily. Call if symptoms persist or worsen.   Plan: ketorolac (TORADOL) 60 MG/2ML SOLN injection          See Patient Instructions    David Cisneros PA-C  [facility]

## 2017-11-28 NOTE — MR AVS SNAPSHOT
After Visit Summary   11/28/2017    Shawn Martini    MRN: [id number]           Patient Information     Date Of Birth          1985        Visit Information        Provider Department      11/28/2017 8:30 AM David Cisneros PA-C [facility]        Today's Diagnoses     Tension headache    -  1      Care Instructions                               Tension Headaches  Tension headaches cause a dull, steady pain on both sides of the head and in the neck and the back of the head. The eyes may also feel tired. Tension headaches can be triggered by lack of sleep, poor posture, eyestrain, stress, and other factors.          To help prevent tension headaches:    Make sure your work area is properly set up to help you avoid neck strain and eyestrain.    Make sure that your eyeglass prescription is current and is appropriate for the work you do.    Learn techniques for relaxing and reducing emotional stress. These include deep breathing, progressive relaxation, and biofeedback.    Maintain a regular exercise regimen under the guidance of a doctor. This can help keep your neck and back flexible, strong, and relaxed.  To relieve the pain:    Use moist heat to relax the muscles. Soak in a hot bath or wrap a warm, moist towel around your neck.    Brush your scalp lightly with a soft hairbrush.    Give yourself a massage. Knead the muscles running from your shoulders up the back of your skull.    Use an ice pack. Apply this directly to the place where you feel pain.    Rest. Sleeping often helps relieve headache pain.    Drink plenty of fluids. Dehydration is another trigger for headaches.    Neck Tension Rehabilitation Exercises   You may do all of these exercises right away but avoid any movements that increase your pain.     Neck rotation with flexion:   Right: Turn your head to the right and clasp your hands behind your head. Let the weight of your arms pull your chin to the right  side of your chest. Relax. Hold for a count of 15. Do this 3 times.   Left: Turn your head to the left and clasp your hands behind your head. Let the weight of your arms pull your chin to the left side of your chest. Relax. Hold for a count of 15. Do this 3 times.     Chin tuck: Place your fingertips on your chin and gently push your head straight back as if you are trying to make a double chin. Keep looking forward as your head moves back. Hold 5 seconds and repeat 5 times.     Scalene stretch: This stretches the neck muscles that attach to your ribs. Sitting in an upright position, clasp both hands behind your back, lower your left shoulder, and tilt your head toward the right. Hold this position for 15 to 30 seconds and then come back to the starting position. Lower your right shoulder and tilt your head toward the left until you feel a stretch. Hold for 15 to 30 seconds. Repeat 3 times on each side.     Neck rotation stretch   Right side: Rotate your neck by looking over your right shoulder. Lift your right hand and place your palm on the left side of your chin. Push your chin with your palm toward your right shoulder. Hold for a count of 10. Do this 3 times.   Left side: Rotate your neck by looking over your left shoulder. Lift your left hand and place your palm on the right side of your chin. Push your chin with your palm toward your left shoulder. Hold for a count of 10. Do this 3 times.     Scapular squeeze: While sitting or standing with your arms by your sides, squeeze your shoulder blades together and hold for 5 seconds. Do 3 sets of 10.     Thoracic extension: While sitting in a chair, clasp both arms behind your head. Gently arch backward and look up toward the ceiling. Repeat 10 times. Do this several times per day.                                 Follow-ups after your visit        Who to contact     If you have questions or need follow up information about today's clinic visit or your schedule please  "contact [facility] directly at [phone].  Normal or non-critical lab and imaging results will be communicated to you by MyChart, letter or phone within 4 business days after the clinic has received the results. If you do not hear from us within 7 days, please contact the clinic through MyChart or phone. If you have a critical or abnormal lab result, we will notify you by phone as soon as possible.  Submit refill requests through [employer] or call your pharmacy and they will forward the refill request to us. Please allow 3 business days for your refill to be completed.          Additional Information About Your Visit        [employer] Information     [employer] lets you send messages to your doctor, view your test results, renew your prescriptions, schedule appointments and more. To sign up, go to [email or web address] . Click on \"Log in\" on the left side of the screen, which will take you to the Welcome page. Then click on \"Sign up Now\" on the right side of the page.     You will be asked to enter the access code listed below, as well as some personal information. Please follow the directions to create your username and password.     Your access code is: [email or web address]  Expires: 2018  8:15 AM     Your access code will  in 90 days. If you need help or a new code, please call your [place] clinic or [phone].        Care EveryWhere ID     This is your Care EveryWhere ID. This could be used by other organizations to access your [place] medical records  [id number]        Your Vitals Were     Pulse Temperature Height Pulse Oximetry BMI (Body Mass Index)       84 98.1  F (36.7  C) (Oral) 5' 11\" (1.803 m) 98% 36.01 kg/m2        Blood Pressure from Last 3 Encounters:   17 102/80   17 110/76   17 104/60    Weight from Last 3 Encounters:   17 258 lb 3.2 oz (117.1 kg)   17 256 lb 8 oz (116.3 kg)   17 250 lb 8 oz (113.6 kg)              Today, you had the following  "    No orders found for display         Today's Medication Changes          These changes are accurate as of: 11/28/17  9:13 AM.  If you have any questions, ask your nurse or doctor.               Start taking these medicines.        Dose/Directions    ketorolac 60 MG/2ML Soln injection   Commonly known as:  TORADOL   Used for:  Tension headache   Started by:  David Cisneros PA-C        Dose:  60 mg   Inject 2 mLs (60 mg) into the muscle once for 1 dose   Quantity:  2 mL   Refills:  0            Where to get your medicines      Some of these will need a paper prescription and others can be bought over the counter.  Ask your nurse if you have questions.     You don't need a prescription for these medications     ketorolac 60 MG/2ML Soln injection                Primary Care Provider Office Phone # Fax #    [facility] Maribel Calhoun [phone]       [address]  [facility] [address]        Equal Access to Services     MARY DISLA : Hadii wesley tapiao Sojessica, waaxda luqadaha, qaybta kaalmada adeegyada, jamal kuhn . So [facility] [phone].    ATENCIÓN: Si habla español, tiene a james disposición servicios gratuitos de asistencia lingüística. Llame al [phone].    We comply with applicable federal civil rights laws and [place] laws. We do not discriminate on the basis of race, color, national origin, age, disability, sex, sexual orientation, or gender identity.            Thank you!     Thank you for choosing [facility]  for your care. Our goal is always to provide you with excellent care. Hearing back from our patients is one way we can continue to improve our services. Please take a few minutes to complete the written survey that you may receive in the mail after your visit with us. Thank you!             Your Updated Medication List - Protect others around you: Learn how to safely use, store and throw away your medicines at  www.disposemymeds.org.          This list is accurate as of: 11/28/17  9:13 AM.  Always use your most recent med list.                   Brand Name Dispense Instructions for use Diagnosis    ketorolac 60 MG/2ML Soln injection    TORADOL    2 mL    Inject 2 mLs (60 mg) into the muscle once for 1 dose    Tension headache

## 2017-11-28 NOTE — PATIENT INSTRUCTIONS
Tension Headaches  Tension headaches cause a dull, steady pain on both sides of the head and in the neck and the back of the head. The eyes may also feel tired. Tension headaches can be triggered by lack of sleep, poor posture, eyestrain, stress, and other factors.          To help prevent tension headaches:    Make sure your work area is properly set up to help you avoid neck strain and eyestrain.    Make sure that your eyeglass prescription is current and is appropriate for the work you do.    Learn techniques for relaxing and reducing emotional stress. These include deep breathing, progressive relaxation, and biofeedback.    Maintain a regular exercise regimen under the guidance of a doctor. This can help keep your neck and back flexible, strong, and relaxed.  To relieve the pain:    Use moist heat to relax the muscles. Soak in a hot bath or wrap a warm, moist towel around your neck.    Brush your scalp lightly with a soft hairbrush.    Give yourself a massage. Knead the muscles running from your shoulders up the back of your skull.    Use an ice pack. Apply this directly to the place where you feel pain.    Rest. Sleeping often helps relieve headache pain.    Drink plenty of fluids. Dehydration is another trigger for headaches.    Neck Tension Rehabilitation Exercises   You may do all of these exercises right away but avoid any movements that increase your pain.     Neck rotation with flexion:   Right: Turn your head to the right and clasp your hands behind your head. Let the weight of your arms pull your chin to the right side of your chest. Relax. Hold for a count of 15. Do this 3 times.   Left: Turn your head to the left and clasp your hands behind your head. Let the weight of your arms pull your chin to the left side of your chest. Relax. Hold for a count of 15. Do this 3 times.     Chin tuck: Place your fingertips on your chin and gently push your head straight back as if you are  trying to make a double chin. Keep looking forward as your head moves back. Hold 5 seconds and repeat 5 times.     Scalene stretch: This stretches the neck muscles that attach to your ribs. Sitting in an upright position, clasp both hands behind your back, lower your left shoulder, and tilt your head toward the right. Hold this position for 15 to 30 seconds and then come back to the starting position. Lower your right shoulder and tilt your head toward the left until you feel a stretch. Hold for 15 to 30 seconds. Repeat 3 times on each side.     Neck rotation stretch   Right side: Rotate your neck by looking over your right shoulder. Lift your right hand and place your palm on the left side of your chin. Push your chin with your palm toward your right shoulder. Hold for a count of 10. Do this 3 times.   Left side: Rotate your neck by looking over your left shoulder. Lift your left hand and place your palm on the right side of your chin. Push your chin with your palm toward your left shoulder. Hold for a count of 10. Do this 3 times.     Scapular squeeze: While sitting or standing with your arms by your sides, squeeze your shoulder blades together and hold for 5 seconds. Do 3 sets of 10.     Thoracic extension: While sitting in a chair, clasp both arms behind your head. Gently arch backward and look up toward the ceiling. Repeat 10 times. Do this several times per day.

## 2018-01-02 ENCOUNTER — OFFICE VISIT (OUTPATIENT)
Dept: PEDIATRICS | Facility: CLINIC | Age: 33
End: 2018-01-02
Payer: COMMERCIAL

## 2018-01-02 VITALS
HEIGHT: 71 IN | SYSTOLIC BLOOD PRESSURE: 110 MMHG | WEIGHT: 254.6 LBS | DIASTOLIC BLOOD PRESSURE: 76 MMHG | HEART RATE: 85 BPM | OXYGEN SATURATION: 97 % | BODY MASS INDEX: 35.64 KG/M2 | TEMPERATURE: 97.8 F

## 2018-01-02 DIAGNOSIS — J01.90 ACUTE SINUSITIS WITH SYMPTOMS > 10 DAYS: Primary | ICD-10-CM

## 2018-01-02 PROCEDURE — 99213 OFFICE O/P EST LOW 20 MIN: CPT | Performed by: PHYSICIAN ASSISTANT

## 2018-01-02 NOTE — PROGRESS NOTES
"  SUBJECTIVE:   Shawn Martini is a 32 year old male who presents to clinic today for the following health issues:    Acute Illness   Acute illness concerns: sinus  Onset: 5 weeks    Fever: no    Chills/Sweats: YES    Headache (location?): no    Sinus Pressure: yes--fullness and improving    Nasal     Conjunctivitis:  no    Ear Pain: no    Rhinorrhea: YES    PND present    Congestion: YES- nasal and chest    Sore Throat: YES- occasional      Cough: YES-productive of yellow sputum, barking only when laying down    Wheeze: no    Decreased Appetite: YES    Nausea: YES    Vomiting: no    Diarrhea:  YES- twice per day    Dysuria/Freq.: no    Fatigue/Achiness: no    Sick/Strep Exposure: no     Therapies Tried and outcome: mucinex    ROS:  ROS otherwise negative    OBJECTIVE:                                                    /76 (BP Location: Right arm, Cuff Size: Adult Large)  Pulse 85  Temp 97.8  F (36.6  C) (Oral)  Ht 5' 11\" (1.803 m)  Wt 254 lb 9.6 oz (115.5 kg)  SpO2 97%  BMI 35.51 kg/m2  Body mass index is 35.51 kg/(m^2).   GENERAL: alert, no distress  HENT: ear canals- normal; TMs- normal; Nose- normal; Mouth- no ulcers, no lesions; frontal sinus tenderness  NECK: no tenderness, no adenopathy  RESP: lungs clear to auscultation - no rales, no rhonchi, no wheezes  CV: regular rates and rhythm, normal S1 S2, no S3 or S4 and no murmur, no click or rub    Diagnostic test results:  No results found for this or any previous visit (from the past 24 hour(s)).       ASSESSMENT/PLAN:                                                    (J01.90) Acute sinusitis with symptoms > 10 days  (primary encounter diagnosis)  Comment: begin antibiotics as directed.   Plan: amoxicillin-clavulanate (AUGMENTIN) 875-125 MG         per tablet          See Patient Instructions    David Cisneros PA-C  [facility]  "

## 2018-01-02 NOTE — NURSING NOTE
"Chief Complaint   Patient presents with     Sinus Problem       Initial /76 (BP Location: Right arm, Cuff Size: Adult Large)  Pulse 85  Temp 97.8  F (36.6  C) (Oral)  Ht 5' 11\" (1.803 m)  Wt 254 lb 9.6 oz (115.5 kg)  SpO2 97%  BMI 35.51 kg/m2 Estimated body mass index is 35.51 kg/(m^2) as calculated from the following:    Height as of this encounter: 5' 11\" (1.803 m).    Weight as of this encounter: 254 lb 9.6 oz (115.5 kg).  Medication Reconciliation: complete   Ariel Porter CMA      "

## 2018-01-02 NOTE — MR AVS SNAPSHOT
"              After Visit Summary   2018    Shawn Martini    MRN: [id number]           Patient Information     Date Of Birth          1985        Visit Information        Provider Department      2018 8:10 AM David Cisneros PA-C [facility]        Today's Diagnoses     Acute sinusitis with symptoms > 10 days    -  1      Care Instructions    Begin antibiotics   Take with food            Follow-ups after your visit        Who to contact     If you have questions or need follow up information about today's clinic visit or your schedule please [facility] at [phone].  Normal or non-critical lab and imaging results will be communicated to you by [employer], letter or phone within 4 business days after the clinic has received the results. If you do not hear from us within 7 days, please contact the clinic through [employer] or phone. If you have a critical or abnormal lab result, we will notify you by phone as soon as possible.  Submit refill requests through [employer] or call your pharmacy and they will forward the refill request to us. Please allow 3 business days for your refill to be completed.          Additional Information About Your Visit        MyChart Information     [employer] lets you send messages to your doctor, view your test results, renew your prescriptions, schedule appointments and more. To sign up, go to [email or web address] . Click on \"Log in\" on the left side of the screen, which will take you to the Welcome page. Then click on \"Sign up Now\" on the right side of the page.     You will be asked to enter the access code listed below, as well as some personal information. Please follow the directions to create your username and password.     Your access code is: [email or web address]  Expires: 2018  8:15 AM     Your access code will  in 90 days. If you need help or a new code, please call your [facility] or [phone].      " "  Care EveryWhere ID     This is your Care EveryWhere ID. This could be used by other organizations to access your [place] medical records  [id number]        Your Vitals Were     Pulse Temperature Height Pulse Oximetry BMI (Body Mass Index)       85 97.8  F (36.6  C) (Oral) 5' 11\" (1.803 m) 97% 35.51 kg/m2        Blood Pressure from Last 3 Encounters:   01/02/18 110/76   11/28/17 102/80   11/02/17 110/76    Weight from Last 3 Encounters:   01/02/18 254 lb 9.6 oz (115.5 kg)   11/28/17 258 lb 3.2 oz (117.1 kg)   11/02/17 256 lb 8 oz (116.3 kg)              Today, you had the following     No orders found for display         Today's Medication Changes          These changes are accurate as of: 1/2/18  8:26 AM.  If you have any questions, ask your nurse or doctor.               Start taking these medicines.        Dose/Directions    amoxicillin-clavulanate 875-125 MG per tablet   Commonly known as:  AUGMENTIN   Used for:  Acute sinusitis with symptoms > 10 days   Started by:  David Cisneros PA-C        Dose:  1 tablet   Take 1 tablet by mouth 2 times daily   Quantity:  20 tablet   Refills:  0            Where to get your medicines      These medications were sent to [facility] - [address]   [address]  [address], [place] [address]     Phone:  [phone]     amoxicillin-clavulanate 875-125 MG per tablet                Primary Care Provider Office Phone # Fax #    [facility] Maribel Calhoun [phone]       [address]  [facility] [place] [address]        Equal Access to Services     ELIDIA DISLA AH: Steve Rudolph, wamoiz luindigo, qareannata kaalmada maureen, jamal escalante. So [facility] [phone].    ATENCIÓN: Si habla español, tiene a james disposición servicios gratuitos de asistencia lingüística. Llame al [phone].    We comply with applicable federal civil rights laws and " [place] laws. We do not discriminate on the basis of race, color, national origin, age, disability, sex, sexual orientation, or gender identity.            Thank you!     Thank you for choosing [facility]  for your care. Our goal is always to provide you with excellent care. Hearing back from our patients is one way we can continue to improve our services. Please take a few minutes to complete the written survey that you may receive in the mail after your visit with us. Thank you!             Your Updated Medication List - Protect others around you: Learn how to safely use, store and throw away your medicines at www.disposemymeds.org.          This list is accurate as of: 1/2/18  8:26 AM.  Always use your most recent med list.                   Brand Name Dispense Instructions for use Diagnosis    amoxicillin-clavulanate 875-125 MG per tablet    AUGMENTIN    20 tablet    Take 1 tablet by mouth 2 times daily    Acute sinusitis with symptoms > 10 days

## 2018-02-02 ENCOUNTER — OFFICE VISIT (OUTPATIENT)
Dept: PEDIATRICS | Facility: CLINIC | Age: 33
End: 2018-02-02
Payer: COMMERCIAL

## 2018-02-02 VITALS
OXYGEN SATURATION: 98 % | HEIGHT: 71 IN | DIASTOLIC BLOOD PRESSURE: 78 MMHG | SYSTOLIC BLOOD PRESSURE: 104 MMHG | WEIGHT: 254.7 LBS | BODY MASS INDEX: 35.66 KG/M2 | TEMPERATURE: 98.1 F | HEART RATE: 82 BPM

## 2018-02-02 DIAGNOSIS — J06.9 UPPER RESPIRATORY TRACT INFECTION, UNSPECIFIED TYPE: Primary | ICD-10-CM

## 2018-02-02 DIAGNOSIS — R07.0 THROAT PAIN: ICD-10-CM

## 2018-02-02 LAB
DEPRECATED S PYO AG THROAT QL EIA: NORMAL
SPECIMEN SOURCE: NORMAL

## 2018-02-02 PROCEDURE — 87880 STREP A ASSAY W/OPTIC: CPT | Performed by: INTERNAL MEDICINE

## 2018-02-02 PROCEDURE — 99213 OFFICE O/P EST LOW 20 MIN: CPT | Performed by: INTERNAL MEDICINE

## 2018-02-02 PROCEDURE — 87081 CULTURE SCREEN ONLY: CPT | Performed by: INTERNAL MEDICINE

## 2018-02-02 RX ORDER — AZITHROMYCIN 250 MG/1
TABLET, FILM COATED ORAL
Qty: 6 TABLET | Refills: 0 | Status: SHIPPED | OUTPATIENT
Start: 2018-02-02 | End: 2018-05-22

## 2018-02-02 NOTE — PATIENT INSTRUCTIONS
"Saline spray (nonmedicated salt water) in small squirt bottles can be used every hour or two during the day, as can humidifiers during the night.  Steam showers can help keep mucous loose.       Expectorants (like \"Mucinex\" or \"Robitussin\") may help, as can using cough supressants (like the \"DM\" in Mucinex DM and Robitussin DM).    Might benefit from antihistamines like Zyrtec (cetirizine) for relief of congestion.    Fill prescription for a zpack only if symptoms not improving in 5 days.   "

## 2018-02-02 NOTE — MR AVS SNAPSHOT
"              After Visit Summary   2/2/2018    Shawn Martini    MRN: [id number]           Patient Information     Date Of Birth          1985        Visit Information        Provider Department      2/2/2018 9:00 AM Nicolás Farah MD [facility]        Today's Diagnoses     Upper respiratory tract infection, unspecified type    -  1    Throat pain          Care Instructions    Saline spray (nonmedicated salt water) in small squirt bottles can be used every hour or two during the day, as can humidifiers during the night.  Steam showers can help keep mucous loose.       Expectorants (like \"Mucinex\" or \"Robitussin\") may help, as can using cough supressants (like the \"DM\" in Mucinex DM and Robitussin DM).    Might benefit from antihistamines like Zyrtec (cetirizine) for relief of congestion.    Fill prescription for a zpack only if symptoms not improving in 5 days.           Follow-ups after your visit        Who to contact     If you have questions or need follow up information about today's clinic visit or your schedule please contact [facility] at [phone].  Normal or non-critical lab and imaging results will be communicated to you by Kitehart, letter or phone within 4 business days after the clinic has received the results. If you do not hear from us within 7 days, please contact the clinic through Kitehart or phone. If you have a critical or abnormal lab result, we will notify you by phone as soon as possible.  Submit refill requests through [employer] or call your pharmacy and they will forward the refill request to us. Please allow 3 business days for your refill to be completed.          Additional Information About Your Visit        MyChart Information     [employer] lets you send messages to your doctor, view your test results, renew your prescriptions, schedule appointments and more. To sign up, go to [email or web address] . Click on \"Log in\" on the left side of the " "screen, which will take you to the Welcome page. Then click on \"Sign up Now\" on the right side of the page.     You will be asked to enter the access code listed below, as well as some personal information. Please follow the directions to create your username and password.     Your access code is: [email or web address]  Expires: 5/3/2018  9:45 AM     Your access code will  in 90 days. If you need help or a new code, please call your [facility] or [phone].        Care EveryWhere ID     This is your Care EveryWhere ID. This could be used by other organizations to access your [facility] medical records  [id number]        Your Vitals Were     Pulse Temperature Height Pulse Oximetry BMI (Body Mass Index)       82 98.1  F (36.7  C) (Oral) 5' 11\" (1.803 m) 98% 35.52 kg/m2        Blood Pressure from Last 3 Encounters:   18 104/78   18 110/76   17 102/80    Weight from Last 3 Encounters:   18 254 lb 11.2 oz (115.5 kg)   18 254 lb 9.6 oz (115.5 kg)   17 258 lb 3.2 oz (117.1 kg)              We Performed the Following     Strep, Rapid Screen          Today's Medication Changes          These changes are accurate as of 18  9:45 AM.  If you have any questions, ask your nurse or doctor.               Start taking these medicines.        Dose/Directions    azithromycin 250 MG tablet   Commonly known as:  ZITHROMAX   Used for:  Upper respiratory tract infection, unspecified type   Started by:  Nicolás Farah MD        Two tablets first day, then one tablet daily for four days.   Quantity:  6 tablet   Refills:  0            Where to get your medicines      Some of these will need a paper prescription and others can be bought over the counter.  Ask your nurse if you have questions.     Bring a paper prescription for each of these medications     azithromycin 250 MG tablet                Primary Care Provider Office Phone # Fax #    Brian Maribel Calhoun [phone]       [facility] " [address]  [facility] [address]        Equal Access to Services     BGELIDIA NAIF : Hadii aad ku hadkatherinrobert Soarmandoali, wajoseda luqadaha, qaybta kaalmada kathiecurtissanjeev, jamal benoit ivannaarlene ambrosiomaria eugeniatoo escalante. So [facility] [phone].    ATENCIÓN: Si habla español, tiene a james disposición servicios gratuitos de asistencia lingüística. Llame al [phone].    We comply with applicable federal civil rights laws and [place] laws. We do not discriminate on the basis of race, color, national origin, age, disability, sex, sexual orientation, or gender identity.            Thank you!     Thank you for choosing [facility]  for your care. Our goal is always to provide you with excellent care. Hearing back from our patients is one way we can continue to improve our services. Please take a few minutes to complete the written survey that you may receive in the mail after your visit with us. Thank you!             Your Updated Medication List - Protect others around you: Learn how to safely use, store and throw away your medicines at www.disposemymeds.org.          This list is accurate as of 2/2/18  9:45 AM.  Always use your most recent med list.                   Brand Name Dispense Instructions for use Diagnosis    amoxicillin-clavulanate 875-125 MG per tablet    AUGMENTIN    20 tablet    Take 1 tablet by mouth 2 times daily    Acute sinusitis with symptoms > 10 days       azithromycin 250 MG tablet    ZITHROMAX    6 tablet    Two tablets first day, then one tablet daily for four days.    Upper respiratory tract infection, unspecified type

## 2018-02-02 NOTE — PROGRESS NOTES
SUBJECTIVE:   Shawn Martini is a 32 year old male who presents to clinic today for the following health issues:      RESPIRATORY SYMPTOMS      Duration: started 6-7 days ago, worsened last night    Description  nasal congestion, rhinorrhea, sore throat, facial pain/pressure, cough and hoarse voice    Severity: moderate    Accompanying signs and symptoms: Productive cough, yellow phlegm    History (predisposing factors):  Strep and flu exposure    Precipitating or alleviating factors: Mucinex, dayquil    Therapies tried and outcome:  Mucinex, dayquil, help with nasal pressure for about an hour then relief is gone. Steam shower, helpful    Feels like may have a sinus infection.  Began about 7 days ago--congestion of nose, headache; Very bad sore throat last night.  Had a cup of coffee this AM and feels better.      Has history of strep many times per year.     No documented fevers.       No flu shot.  Nonsmoker.       Problem list and histories reviewed & adjusted, as indicated.  Additional history: as documented    Patient Active Problem List   Diagnosis   (none) - all problems resolved or deleted     Past Surgical History:   Procedure Laterality Date     ORTHOPEDIC SURGERY Right 2003    Right knee, ACL/R     SURGICAL HISTORY OF -  Left 2003    Left wrist, ganglion cyst excision     WRIST SURGERY      cyst removal       Social History   Substance Use Topics     Smoking status: Never Smoker     Smokeless tobacco: Never Used     Alcohol use No      Comment: rare     Family History   Problem Relation Age of Onset     Family History Negative Mother      Family History Negative Father          Current Outpatient Prescriptions   Medication Sig Dispense Refill     amoxicillin-clavulanate (AUGMENTIN) 875-125 MG per tablet Take 1 tablet by mouth 2 times daily (Patient not taking: Reported on 2/2/2018) 20 tablet 0     No Known Allergies  BP Readings from Last 3 Encounters:   02/02/18 104/78   01/02/18 110/76   11/28/17  "102/80    Wt Readings from Last 3 Encounters:   02/02/18 254 lb 11.2 oz (115.5 kg)   01/02/18 254 lb 9.6 oz (115.5 kg)   11/28/17 258 lb 3.2 oz (117.1 kg)                  Labs reviewed in EPIC    Reviewed and updated as needed this visit by clinical staff       Reviewed and updated as needed this visit by Provider         ROS:  C: NEGATIVE for fever, chills, change in weight  E/M: NEGATIVE for ear, mouth and throat problems  R: NEGATIVE for significant cough or SOB  CV: NEGATIVE for chest pain, palpitations or peripheral edema    OBJECTIVE:                                                    /78 (BP Location: Right arm, Patient Position: Chair, Cuff Size: Adult Large)  Pulse 82  Temp 98.1  F (36.7  C) (Oral)  Ht 5' 11\" (1.803 m)  Wt 254 lb 11.2 oz (115.5 kg)  SpO2 98%  BMI 35.52 kg/m2  Body mass index is 35.52 kg/(m^2).   GENERAL: healthy, alert, well nourished, well hydrated, no distress  HENT: ear canals- normal; TMs- normal; Nose- normal; Mouth- no ulcers, no lesions  NECK: no tenderness, no adenopathy, no asymmetry, no masses, no stiffness; thyroid- normal to palpation  RESP: lungs clear to auscultation - no rales, no rhonchi, no wheezes  CV: regular rates and rhythm, normal S1 S2, no S3 or S4 and no murmur, no click or rub -  ABDOMEN: soft, no tenderness, no  hepatosplenomegaly, no masses, normal bowel sounds    Diagnostic test results:  Diagnostic Test Results:  Strep: negative      ASSESSMENT/PLAN:                                                    Upper respiratory infection:    Saline spray (nonmedicated salt water) in small squirt bottles can be used every hour or two during the day, as can humidifiers during the night.  Steam showers can help keep mucous loose.       Expectorants (like \"Mucinex\" or \"Robitussin\") may help, as can using cough supressants (like the \"DM\" in Mucinex DM and Robitussin DM).    Might benefit from antihistamines like Zyrtec (cetirizine) for relief of congestion.     If " symptoms worsen, patient is going to [place] in 1 week; I told him we could try a zpack but only if symptoms not improving over the next 5-7 days with above conservative management.     See Patient Instructions    Nicolás Farah MD  [facility]

## 2018-02-02 NOTE — NURSING NOTE
"Chief Complaint   Patient presents with     URI     Sinus infection, sore throat       Initial /78 (BP Location: Right arm, Patient Position: Chair, Cuff Size: Adult Large)  Pulse 82  Temp 98.1  F (36.7  C) (Oral)  Ht 5' 11\" (1.803 m)  Wt 254 lb 11.2 oz (115.5 kg)  SpO2 98%  BMI 35.52 kg/m2 Estimated body mass index is 35.52 kg/(m^2) as calculated from the following:    Height as of this encounter: 5' 11\" (1.803 m).    Weight as of this encounter: 254 lb 11.2 oz (115.5 kg).  Medication Reconciliation: complete   Samanta CAMARENA      "

## 2018-02-03 LAB
BACTERIA SPEC CULT: NORMAL
SPECIMEN SOURCE: NORMAL

## 2018-05-22 ENCOUNTER — OFFICE VISIT (OUTPATIENT)
Dept: FAMILY MEDICINE | Facility: CLINIC | Age: 33
End: 2018-05-22
Payer: COMMERCIAL

## 2018-05-22 VITALS
DIASTOLIC BLOOD PRESSURE: 80 MMHG | SYSTOLIC BLOOD PRESSURE: 120 MMHG | TEMPERATURE: 98.4 F | OXYGEN SATURATION: 95 % | BODY MASS INDEX: 36.26 KG/M2 | HEART RATE: 95 BPM | WEIGHT: 260 LBS | RESPIRATION RATE: 16 BRPM

## 2018-05-22 DIAGNOSIS — J18.9 PNEUMONIA DUE TO INFECTIOUS ORGANISM, UNSPECIFIED LATERALITY, UNSPECIFIED PART OF LUNG: Primary | ICD-10-CM

## 2018-05-22 PROCEDURE — 99214 OFFICE O/P EST MOD 30 MIN: CPT | Performed by: NURSE PRACTITIONER

## 2018-05-22 RX ORDER — LEVOFLOXACIN 500 MG/1
500 TABLET, FILM COATED ORAL DAILY
Qty: 7 TABLET | Refills: 0 | Status: SHIPPED | OUTPATIENT
Start: 2018-05-22 | End: 2019-11-19

## 2018-05-22 RX ORDER — PREDNISONE 20 MG/1
20 TABLET ORAL DAILY
Qty: 5 TABLET | Refills: 0 | Status: SHIPPED | OUTPATIENT
Start: 2018-05-22 | End: 2019-11-19

## 2018-05-22 RX ORDER — CODEINE PHOSPHATE AND GUAIFENESIN 10; 100 MG/5ML; MG/5ML
1-2 SOLUTION ORAL EVERY 6 HOURS PRN
Qty: 120 ML | Refills: 0 | Status: SHIPPED | OUTPATIENT
Start: 2018-05-22 | End: 2019-11-19

## 2018-05-22 NOTE — MR AVS SNAPSHOT
After Visit Summary   5/22/2018    Shawn Martini    MRN: [id number]           Patient Information     Date Of Birth          1985        Visit Information        Provider Department      5/22/2018 9:20 AM Nelia Daugherty APRN [facility]        Today's Diagnoses     Pneumonia due to infectious organism, unspecified laterality, unspecified part of lung    -  1      Care Instructions      Pneumonia (Adult)  Pneumonia is an infection deep within the lungs. It is in the small air sacs (alveoli). Pneumonia may be caused by a virus or bacteria. Pneumonia caused by bacteria is usually treated with an antibiotic. Severe cases may need to be treated in the hospital. Milder cases can be treated at home. Symptoms usually start to get better during the first 2 days of treatment.    Home care  Follow these guidelines when caring for yourself at home:    Rest at home for the first 2 to 3 days, or until you feel stronger. Don t let yourself get overly tired when you go back to your activities.    Stay away from cigarette smoke - yours or other people s.    You may use acetaminophen or ibuprofen to control fever or pain, unless another medicine was prescribed. If you have chronic liver or kidney disease, talk with your healthcare provider before using these medicines. Also talk with your provider if you ve had a stomach ulcer or gastrointestinal bleeding. Don t give aspirin to anyone younger than 18 years of age who is ill with a fever. It may cause severe liver damage.    Your appetite may be poor, so a light diet is fine.    Drink 6 to 8 glasses of fluids every day to make sure you are getting enough fluids. Beverages can include water, sport drinks, sodas without caffeine, juices, tea, or soup. Fluids will help loosen secretions in the lung. This will make it easier for you to cough up the phlegm (sputum). If you also have heart or kidney disease, check with your healthcare  provider before you drink extra fluids.    Take antibiotic medicine prescribed until it is all gone, even if you are feeling better after a few days.  Follow-up care  Follow up with your healthcare provider in the next 2 to 3 days, or as advised. This is to be sure the medicine is helping you get better.  If you are 65 or older, you should get a pneumococcal vaccine and a yearly flu (influenza) shot. You should also get these vaccines if you have chronic lung disease like asthma, emphysema, or COPD. Recently, a second type of pneumonia vaccine has become available for everyone over 65 years old. This is in addition to the previous vaccine. Ask your provider about this.  When to seek medical advice  Call your healthcare provider right away if any of these occur:    You don t get better within the first 48 hours of treatment    Shortness of breath gets worse    Rapid breathing (more than 25 breaths per minute)    Coughing up blood    Chest pain gets worse with breathing    Fever of 100.4 F (38 C) or higher that doesn t get better with fever medicine    Weakness, dizziness, or fainting that gets worse    Thirst or dry mouth that gets worse    Sinus pain, headache, or a stiff neck    Chest pain not caused by coughing  Date Last Reviewed: 1/1/2017 2000-2017 [employer]. [address] [place] [address]. All rights reserved. This information is not intended as a substitute for professional medical care. Always follow your healthcare professional's instructions.                Follow-ups after your visit        Follow-up notes from your care team     Return in about 1 week (around 5/29/2018) for no improvement; sooner if any worsening .      Who to contact     If you have questions or need follow up information about today's clinic visit or your schedule please contact [facility] directly at [phone].  Normal or non-critical lab and imaging results will be communicated to you  "by AV Homeshart, letter or phone within 4 business days after the clinic has received the results. If you do not hear from us within 7 days, please contact the clinic through [employer] or phone. If you have a critical or abnormal lab result, we will notify you by phone as soon as possible.  Submit refill requests through [employer] or call your pharmacy and they will forward the refill request to us. Please allow 3 business days for your refill to be completed.          Additional Information About Your Visit        [facility] Information     [employer] lets you send messages to your doctor, view your test results, renew your prescriptions, schedule appointments and more. To sign up, go to [email or web address] . Click on \"Log in\" on the left side of the screen, which will take you to the Welcome page. Then click on \"Sign up Now\" on the right side of the page.     You will be asked to enter the access code listed below, as well as some personal information. Please follow the directions to create your username and password.     Your access code is: [email or web address]  Expires: 2018  9:53 AM     Your access code will  in 90 days. If you need help or a new code, please call your [place] clinic or [phone].        Care EveryWhere ID     This is your Care EveryWhere ID. This could be used by other organizations to access your [place] medical records  [id number]        Your Vitals Were     Pulse Temperature Respirations Pulse Oximetry BMI (Body Mass Index)       95 98.4  F (36.9  C) (Oral) 16 95% 36.26 kg/m2        Blood Pressure from Last 3 Encounters:   18 120/80   18 104/78   18 110/76    Weight from Last 3 Encounters:   18 260 lb (117.9 kg)   18 254 lb 11.2 oz (115.5 kg)   18 254 lb 9.6 oz (115.5 kg)              Today, you had the following     No orders found for display         Today's Medication Changes          These changes are accurate as of 18  9:53 AM.  If you have any " questions, ask your nurse or doctor.               Start taking these medicines.        Dose/Directions    guaiFENesin-codeine 100-10 MG/5ML Soln solution   Commonly known as:  ROBITUSSIN AC   Used for:  Pneumonia due to infectious organism, unspecified laterality, unspecified part of lung   Started by:  Nelia Daugherty APRN CNP        Dose:  1-2 tsp.   Take 5-10 mLs by mouth every 6 hours as needed for cough   Quantity:  120 mL   Refills:  0       levofloxacin 500 MG tablet   Commonly known as:  LEVAQUIN   Used for:  Pneumonia due to infectious organism, unspecified laterality, unspecified part of lung   Started by:  Nelia Daugherty APRN CNP        Dose:  500 mg   Take 1 tablet (500 mg) by mouth daily   Quantity:  7 tablet   Refills:  0         These medicines have changed or have updated prescriptions.        Dose/Directions    * PREDNISONE PO   This may have changed:  Another medication with the same name was added. Make sure you understand how and when to take each.   Changed by:  Nelia Daugherty APRN CNP        Dose:  4 mg   Take 4 mg by mouth   Refills:  0       * predniSONE 20 MG tablet   Commonly known as:  DELTASONE   This may have changed:  You were already taking a medication with the same name, and this prescription was added. Make sure you understand how and when to take each.   Used for:  Pneumonia due to infectious organism, unspecified laterality, unspecified part of lung   Changed by:  Nelia Daugherty APRN CNP        Dose:  20 mg   Take 1 tablet (20 mg) by mouth daily   Quantity:  5 tablet   Refills:  0       * Notice:  This list has 2 medication(s) that are the same as other medications prescribed for you. Read the directions carefully, and ask your doctor or other care provider to review them with you.         Where to get your medicines      These medications were sent to [facility]/pharmacy #[id number] - [place] - [address]  [address]  [facility]  [address]     Phone:  [phone]     levofloxacin 500 MG tablet    predniSONE 20 MG tablet         Some of these will need a paper prescription and others can be bought over the counter.  Ask your nurse if you have questions.     Bring a paper prescription for each of these medications     guaiFENesin-codeine 100-10 MG/5ML Soln solution                Primary Care Provider Office Phone # Fax #    Brian Maribel Calhoun [phone]       [address] [place] [address]        Equal Access to Services     MARY DISLA : Hadii aad ku hadasho Soomaali, waaxda luqadaha, qaybta kaalmada adeegyada, waxay kodyin hayaaarlene kuhn . So [facility] [phone].    ATENCIÓN: Si habla español, tiene a james disposición servicios gratuitos de asistencia lingüística. [facility] [phone].    We comply with applicable federal civil rights laws and [place] laws. We do not discriminate on the basis of race, color, national origin, age, disability, sex, sexual orientation, or gender identity.            Thank you!     Thank you for choosing [facility]  for your care. Our goal is always to provide you with excellent care. Hearing back from our patients is one way we can continue to improve our services. Please take a few minutes to complete the written survey that you may receive in the mail after your visit with us. Thank you!             Your Updated Medication List - Protect others around you: Learn how to safely use, store and throw away your medicines at www.disposemymeds.org.          This list is accurate as of 5/22/18  9:53 AM.  Always use your most recent med list.                   Brand Name Dispense Instructions for use Diagnosis    BENZONATATE PO      Take 100 mg by mouth        guaiFENesin-codeine 100-10 MG/5ML Soln solution    ROBITUSSIN AC    120 mL    Take 5-10 mLs by mouth every 6 hours as needed for cough    Pneumonia due to infectious organism, unspecified laterality,  unspecified part of lung       levofloxacin 500 MG tablet    LEVAQUIN    7 tablet    Take 1 tablet (500 mg) by mouth daily    Pneumonia due to infectious organism, unspecified laterality, unspecified part of lung       * PREDNISONE PO      Take 4 mg by mouth        * predniSONE 20 MG tablet    DELTASONE    5 tablet    Take 1 tablet (20 mg) by mouth daily    Pneumonia due to infectious organism, unspecified laterality, unspecified part of lung       ZITHROMAX PO      Take 250 mg by mouth        * Notice:  This list has 2 medication(s) that are the same as other medications prescribed for you. Read the directions carefully, and ask your doctor or other care provider to review them with you.

## 2018-05-22 NOTE — PATIENT INSTRUCTIONS

## 2018-05-22 NOTE — PROGRESS NOTES
SUBJECTIVE:   Shawn Martini is a 32 year old male who presents to clinic today for the following health issues:      ED/UC Followup:    Facility:  [facility]   Date of visit: 5/20/2018  Reason for visit: cough, wheezing  Current Status: dx with pneumonia and bronchitis, treated with zithromax, steroid taper, inhaler and benzonatate tabs still has cough and wheezing, worse last night, was having a hard time breathing.     Cough is worse when laying down.  Last night felt like symptoms were the worst they have been.   Cough is producing a small amount of phlegm.  Feeling SOB and wheezy.  Afebrile, but has had chills and sweats.  Has taken 2 days of zithromax and steroids.   Using inhaler 4x/day.  Doesn't feel like this helps very much.  Has been taking benzonatate 3x/day and doesn't feel like this helps.      Problem list and histories reviewed & adjusted, as indicated.  Additional history: as documented    Current Outpatient Prescriptions   Medication Sig Dispense Refill     Azithromycin (ZITHROMAX PO) Take 250 mg by mouth       BENZONATATE PO Take 100 mg by mouth       PREDNISONE PO Take 4 mg by mouth       No Known Allergies    Reviewed and updated as needed this visit by clinical staff  Tobacco  Allergies  Meds  Med Hx  Surg Hx  Fam Hx  Soc Hx      Reviewed and updated as needed this visit by Provider         ROS:  Constitutional, HEENT, cardiovascular, pulmonary, gi and gu systems are negative, except as otherwise noted.    OBJECTIVE:     /80 (BP Location: Right arm, Patient Position: Chair, Cuff Size: Adult Large)  Pulse 95  Temp 98.4  F (36.9  C) (Oral)  Resp 16  Wt 260 lb (117.9 kg)  SpO2 95%  BMI 36.26 kg/m2  Body mass index is 36.26 kg/(m^2).  GENERAL: healthy, alert and no distress  EYES: Eyes grossly normal to inspection  HENT: ear canals and TM's normal, nose and mouth without ulcers or lesions, MMM  NECK: no adenopathy, no asymmetry, masses, or scars and thyroid  normal to palpation  RESP: lungs clear to auscultation - fine crackles in LLL, no rhonchi or wheezing, normal effort  CV: regular rate and rhythm, normal S1 S2, no S3 or S4, no murmur  SKIN: no suspicious lesions or rashes    Diagnostic Test Results:  none     ASSESSMENT/PLAN:       1. Pneumonia due to infectious organism, unspecified laterality, unspecified part of lung  Worsening symptoms after 2 days of zithromax.  Will start on levaquin.  Side effects to cough syrup discussed.  Do not take if need to be alert such as while driving, with sedating medications or with alcohol. Tylenol/iburpofen as needed.  Increase fluids and rest.  Cough may linger.    - predniSONE (DELTASONE) 20 MG tablet; Take 1 tablet (20 mg) by mouth daily  Dispense: 5 tablet; Refill: 0  - levofloxacin (LEVAQUIN) 500 MG tablet; Take 1 tablet (500 mg) by mouth daily  Dispense: 7 tablet; Refill: 0  - guaiFENesin-codeine (ROBITUSSIN AC) 100-10 MG/5ML SOLN solution; Take 5-10 mLs by mouth every 6 hours as needed for cough  Dispense: 120 mL; Refill: 0    F/u as needed if no improvement in 3 days; sooner if worsening symptoms     GREGORIO Zaragoza [facility]

## 2018-08-15 ENCOUNTER — TELEPHONE (OUTPATIENT)
Dept: PEDIATRICS | Facility: CLINIC | Age: 33
End: 2018-08-15

## 2018-08-15 NOTE — TELEPHONE ENCOUNTER
Patient developed chest pain last night that awoke him from sleep.  Described this as feeling like heartburn.  Pain was at mid upper abdomen into the sternum.  Chest felt tight.  No radiation of pain.  No shortness of breath.  No diaphoresis.  Took Tums with no relief.  Has had four episodes of chest pain in the past week.  Had eaten one hour prior to going to bed.    This am chest still feels tight.  Symptoms not as severe today.  Patient has never had heartburn that has lasted this long.  Has taken several Tums without complete resolution.    Advised patient to go to [facility] ER for evaluation for possible cardiac symptoms.  Discussed also with Dr. Larios, and she agrees with this recommendation.  Patient is in agreement and will go to the ER.  No further questions.  EMELINA Templeton RN

## 2018-08-23 ENCOUNTER — APPOINTMENT (OUTPATIENT)
Dept: GENERAL RADIOLOGY | Facility: CLINIC | Age: 33
End: 2018-08-23
Attending: EMERGENCY MEDICINE
Payer: COMMERCIAL

## 2018-08-23 ENCOUNTER — HOSPITAL ENCOUNTER (EMERGENCY)
Facility: CLINIC | Age: 33
Discharge: HOME | End: 2018-08-23
Attending: EMERGENCY MEDICINE | Admitting: EMERGENCY MEDICINE
Payer: COMMERCIAL

## 2018-08-23 VITALS
RESPIRATION RATE: 15 BRPM | SYSTOLIC BLOOD PRESSURE: 139 MMHG | DIASTOLIC BLOOD PRESSURE: 84 MMHG | TEMPERATURE: 98 F | OXYGEN SATURATION: 99 %

## 2018-08-23 DIAGNOSIS — R07.9 ACUTE CHEST PAIN: ICD-10-CM

## 2018-08-23 DIAGNOSIS — R53.83 FATIGUE, UNSPECIFIED TYPE: ICD-10-CM

## 2018-08-23 DIAGNOSIS — R06.02 SOB (SHORTNESS OF BREATH): ICD-10-CM

## 2018-08-23 LAB
ANION GAP SERPL CALCULATED.3IONS-SCNC: 5 MMOL/L (ref 3–14)
BASOPHILS # BLD AUTO: 0 10E9/L (ref 0–0.2)
BASOPHILS NFR BLD AUTO: 0.2 %
BUN SERPL-MCNC: 12 MG/DL (ref 7–30)
CALCIUM SERPL-MCNC: 8.3 MG/DL (ref 8.5–10.1)
CHLORIDE SERPL-SCNC: 104 MMOL/L (ref 94–109)
CO2 SERPL-SCNC: 28 MMOL/L (ref 20–32)
CREAT SERPL-MCNC: 0.84 MG/DL (ref 0.66–1.25)
DIFFERENTIAL METHOD BLD: NORMAL
EOSINOPHIL # BLD AUTO: 0 10E9/L (ref 0–0.7)
EOSINOPHIL NFR BLD AUTO: 0.4 %
ERYTHROCYTE [DISTWIDTH] IN BLOOD BY AUTOMATED COUNT: 12.4 % (ref 10–15)
GFR SERPL CREATININE-BSD FRML MDRD: >90 ML/MIN/1.7M2
GLUCOSE SERPL-MCNC: 94 MG/DL (ref 70–99)
HCT VFR BLD AUTO: 43.7 % (ref 40–53)
HGB BLD-MCNC: 15 G/DL (ref 13.3–17.7)
IMM GRANULOCYTES # BLD: 0 10E9/L (ref 0–0.4)
IMM GRANULOCYTES NFR BLD: 0.4 %
INTERPRETATION ECG - MUSE: NORMAL
INTERPRETATION ECG - MUSE: NORMAL
LYMPHOCYTES # BLD AUTO: 2 10E9/L (ref 0.8–5.3)
LYMPHOCYTES NFR BLD AUTO: 35.5 %
MCH RBC QN AUTO: 31.6 PG (ref 26.5–33)
MCHC RBC AUTO-ENTMCNC: 34.3 G/DL (ref 31.5–36.5)
MCV RBC AUTO: 92 FL (ref 78–100)
MONOCYTES # BLD AUTO: 0.8 10E9/L (ref 0–1.3)
MONOCYTES NFR BLD AUTO: 14.4 %
NEUTROPHILS # BLD AUTO: 2.7 10E9/L (ref 1.6–8.3)
NEUTROPHILS NFR BLD AUTO: 49.1 %
NRBC # BLD AUTO: 0 10*3/UL
NRBC BLD AUTO-RTO: 0 /100
PLATELET # BLD AUTO: 152 10E9/L (ref 150–450)
POTASSIUM SERPL-SCNC: 4 MMOL/L (ref 3.4–5.3)
RBC # BLD AUTO: 4.75 10E12/L (ref 4.4–5.9)
SODIUM SERPL-SCNC: 137 MMOL/L (ref 133–144)
TROPONIN I SERPL-MCNC: <0.015 UG/L (ref 0–0.04)
TSH SERPL DL<=0.005 MIU/L-ACNC: 1.69 MU/L (ref 0.4–4)
WBC # BLD AUTO: 5.5 10E9/L (ref 4–11)

## 2018-08-23 PROCEDURE — 71046 X-RAY EXAM CHEST 2 VIEWS: CPT

## 2018-08-23 PROCEDURE — 84443 ASSAY THYROID STIM HORMONE: CPT | Performed by: EMERGENCY MEDICINE

## 2018-08-23 PROCEDURE — 93005 ELECTROCARDIOGRAM TRACING: CPT

## 2018-08-23 PROCEDURE — 93005 ELECTROCARDIOGRAM TRACING: CPT | Mod: 76

## 2018-08-23 PROCEDURE — 99285 EMERGENCY DEPT VISIT HI MDM: CPT | Mod: 25

## 2018-08-23 PROCEDURE — 80048 BASIC METABOLIC PNL TOTAL CA: CPT | Performed by: EMERGENCY MEDICINE

## 2018-08-23 PROCEDURE — 85025 COMPLETE CBC W/AUTO DIFF WBC: CPT | Performed by: EMERGENCY MEDICINE

## 2018-08-23 PROCEDURE — 84484 ASSAY OF TROPONIN QUANT: CPT | Performed by: EMERGENCY MEDICINE

## 2018-08-23 ASSESSMENT — ENCOUNTER SYMPTOMS
FEVER: 0
CHILLS: 0
VOMITING: 0
CHEST TIGHTNESS: 1
DIAPHORESIS: 0
FATIGUE: 1
NAUSEA: 0
ABDOMINAL PAIN: 0
SHORTNESS OF BREATH: 1

## 2018-08-23 NOTE — ED TRIAGE NOTES
"Pt states he has had chest pain for one week, was improving until last night it became worse again, now in center of chest \"feels like bad indigestion\", was very fatigued last night.   History of asthma.   "

## 2018-08-23 NOTE — ED AVS SNAPSHOT
[facility] Emergency Department    [address]    [facility] [address]    Phone:  [phone]    Fax:  [phone]                                       Shawn Martini   MRN: [id number]    Department:  [facility] Emergency Department   Date of Visit:  8/23/2018           Patient Information     Date Of Birth          1985        Your diagnoses for this visit were:     Acute chest pain     SOB (shortness of breath)     Fatigue, unspecified type        You were seen by Samanta Fajardo MD.      Follow-up Information     Follow up with Brian Calhoun MD. Schedule an appointment as soon as possible for a visit in 2 days.    Specialty:  Student in organized health care education/training program    Contact information:    [address] [place] [address] [address]  [facility] [address] [phone]          Discharge Instructions       Please return to the ED if you have active chest pain, shortness of breath, nausea, sweatiness, or other acute changes.  Please follow up with your PCP in the next 2-3 days.        24 Hour Appointment Hotline       To make an appointment at any [facility] clinic, call [phone] ([phone]). If you don't have a family doctor or clinic, we will help you find one. [facility] clinics are conveniently located to serve the needs of you and your family.             Review of your medicines      Our records show that you are taking the medicines listed below. If these are incorrect, please call your family doctor or clinic.        Dose / Directions Last dose taken    BENZONATATE PO   Dose:  100 mg        Take 100 mg by mouth   Refills:  0        guaiFENesin-codeine 100-10 MG/5ML Soln solution   Commonly known as:  ROBITUSSIN AC   Dose:  1-2 tsp.   Quantity:  120 mL        Take 5-10 mLs by mouth every 6 hours as needed for cough   Refills:  0        levofloxacin 500 MG tablet   Commonly known as:  LEVAQUIN   Dose:  500 mg   Quantity:  7 tablet         Take 1 tablet (500 mg) by mouth daily   Refills:  0        * PREDNISONE PO   Dose:  4 mg        Take 4 mg by mouth   Refills:  0        * predniSONE 20 MG tablet   Commonly known as:  DELTASONE   Dose:  20 mg   Quantity:  5 tablet        Take 1 tablet (20 mg) by mouth daily   Refills:  0        ZITHROMAX PO   Dose:  250 mg        Take 250 mg by mouth   Refills:  0        * Notice:  This list has 2 medication(s) that are the same as other medications prescribed for you. Read the directions carefully, and ask your doctor or other care provider to review them with you.            Procedures and tests performed during your visit     Basic metabolic panel    CBC with platelets differential    EKG 12 lead    TSH    Troponin I    XR Chest 2 Views      Orders Needing Specimen Collection     None      Pending Results     Date and Time Order Name Status Description    8/23/2018 1133 EKG 12 lead Preliminary             Pending Culture Results     No orders found from 8/21/2018 to 8/24/2018.            Pending Results Instructions     If you had any lab results that were not finalized at the time of your Discharge, you can call the ED Lab Result RN at [phone]. You will be contacted by this team for any positive Lab results or changes in treatment. The nurses are available 7 days a week from 10A to 6:30P.  You can leave a message 24 hours per day and they will return your call.        Test Results From Your Hospital Stay        8/23/2018 11:57 AM      Component Results     Component Value Ref Range & Units Status    WBC 5.5 4.0 - 11.0 10e9/L Final    RBC Count 4.75 4.4 - 5.9 10e12/L Final    Hemoglobin 15.0 13.3 - 17.7 g/dL Final    Hematocrit 43.7 40.0 - 53.0 % Final    MCV 92 78 - 100 fl Final    MCH 31.6 26.5 - 33.0 pg Final    MCHC 34.3 31.5 - 36.5 g/dL Final    RDW 12.4 10.0 - 15.0 % Final    Platelet Count 152 150 - 450 10e9/L Final    Diff Method Automated Method  Final    % Neutrophils 49.1 % Final    % Lymphocytes  35.5 % Final    % Monocytes 14.4 % Final    % Eosinophils 0.4 % Final    % Basophils 0.2 % Final    % Immature Granulocytes 0.4 % Final    Nucleated RBCs 0 0 /100 Final    Absolute Neutrophil 2.7 1.6 - 8.3 10e9/L Final    Absolute Lymphocytes 2.0 0.8 - 5.3 10e9/L Final    Absolute Monocytes 0.8 0.0 - 1.3 10e9/L Final    Absolute Eosinophils 0.0 0.0 - 0.7 10e9/L Final    Absolute Basophils 0.0 0.0 - 0.2 10e9/L Final    Abs Immature Granulocytes 0.0 0 - 0.4 10e9/L Final    Absolute Nucleated RBC 0.0  Final         8/23/2018 12:32 PM      Component Results     Component Value Ref Range & Units Status    Sodium 137 133 - 144 mmol/L Final    Potassium 4.0 3.4 - 5.3 mmol/L Final    Specimen slightly hemolyzed, potassium may be falsely elevated    Chloride 104 94 - 109 mmol/L Final    Carbon Dioxide 28 20 - 32 mmol/L Final    Anion Gap 5 3 - 14 mmol/L Final    Glucose 94 70 - 99 mg/dL Final    Urea Nitrogen 12 7 - 30 mg/dL Final    Creatinine 0.84 0.66 - 1.25 mg/dL Final    GFR Estimate >90 >60 mL/min/1.7m2 Final    Non  GFR Calc    GFR Estimate If Black >90 >60 mL/min/1.7m2 Final    African American GFR Calc    Calcium 8.3 (L) 8.5 - 10.1 mg/dL Final         8/23/2018 12:32 PM      Component Results     Component Value Ref Range & Units Status    Troponin I ES <0.015 0.000 - 0.045 ug/L Final    The 99th percentile for upper reference range is 0.045 ug/L.  Troponin values   in the range of 0.045 - 0.120 ug/L may be associated with risks of adverse   clinical events.           8/23/2018 12:41 PM      Narrative     XR CHEST 2 VW  8/23/2018 12:36 PM    HISTORY:  Chest pain.    COMPARISON:  5/9/2017.        Impression     IMPRESSION:  Negative.     JAQUELINE NIETO MD         8/23/2018 12:38 PM      Component Results     Component Value Ref Range & Units Status    TSH 1.69 0.40 - 4.00 mU/L Final                Clinical Quality Measure: Blood Pressure Screening     Your blood pressure was checked while you were in  "the emergency department today. The last reading we obtained was  BP: 139/84 . Please read the guidelines below about what these numbers mean and what you should do about them.  If your systolic blood pressure (the top number) is less than 120 and your diastolic blood pressure (the bottom number) is less than 80, then your blood pressure is normal. There is nothing more that you need to do about it.  If your systolic blood pressure (the top number) is 120-139 or your diastolic blood pressure (the bottom number) is 80-89, your blood pressure may be higher than it should be. You should have your blood pressure rechecked within a year by a primary care provider.  If your systolic blood pressure (the top number) is 140 or greater or your diastolic blood pressure (the bottom number) is 90 or greater, you may have high blood pressure. High blood pressure is treatable, but if left untreated over time it can put you at risk for heart attack, stroke, or kidney failure. You should have your blood pressure rechecked by a primary care provider within the next 4 weeks.  If your provider in the emergency department today gave you specific instructions to follow-up with your doctor or provider even sooner than that, you should follow that instruction and not wait for up to 4 weeks for your follow-up visit.        Thank you for choosing [facility]       Thank you for choosing [facility] for your care. Our goal is always to provide you with excellent care. Hearing back from our patients is one way we can continue to improve our services. Please take a few minutes to complete the written survey that you may receive in the mail after you visit with us. Thank you!        [employer] Information     [employer] lets you send messages to your doctor, view your test results, renew your prescriptions, schedule appointments and more. To sign up, go to [email or web address] . Click on \"Log in\" on the left side of the screen, which will take you to " "the Welcome page. Then click on \"Sign up Now\" on the right side of the page.     You will be asked to enter the access code listed below, as well as some personal information. Please follow the directions to create your username and password.     Your access code is: [email or web address]  Expires: 2018 11:52 AM     Your access code will  in 90 days. If you need help or a new code, please call your [facility] clinic or [phone].        Care EveryWhere ID     This is your Care EveryWhere ID. This could be used by other organizations to access your [facility] medical records  [id number]        Equal Access to Services     MARY DISLA : Steve Rudolph, cailin madrid, casandra reddy, jamal escalante. So [facility] [place] [phone].    ATENCIÓN: Si habla español, tiene a james disposición servicios gratuitos de asistencia lingüística. Llame al [phone].    We comply with applicable federal civil rights laws and [place] laws. We do not discriminate on the basis of race, color, national origin, age, disability, sex, sexual orientation, or gender identity.            After Visit Summary       This is your record. Keep this with you and show to your community pharmacist(s) and doctor(s) at your next visit.                  "

## 2018-08-23 NOTE — DISCHARGE INSTRUCTIONS
Please return to the ED if you have active chest pain, shortness of breath, nausea, sweatiness, or other acute changes.  Please follow up with your PCP in the next 2-3 days.

## 2018-08-23 NOTE — ED PROVIDER NOTES
"  History     Chief Complaint:  Chest Pain    HPI   Shawn Martini is a 33 year old male with a history of asthma who presents with chest pain. Around one week ago, the patient states he woke up in the middle of the night with a heartburn-like sensation after drinking a couple beers with his dad. Over the next few days, the patient notes he stayed away from red foods, which typically exacerbate his heartburn, and noted some improvement in his chest pain. However, last night, the patient states he had a headache (alleviated by Ibuprofen) felt a tight feeling in his chest with increased fatigue and shortness of breath similar to the start of his asthma exacerbations. Today, he notes the symptoms persisted, prompting his ED visit this afternoon. The patient states the tightness and \"catch in his breath\" are still present even at rest, and notes he has not noticed any changes to his symptoms with exertion, eating, or position changes. The patient denies any diaphoresis, nausea, vomiting, abdominal pain, fevers, chills, or other acute symptoms.    CARDIAC RISK FACTORS:  Sex:    Male  Tobacco:   No  Hypertension:   No  Hyperlipidemia:  No  Diabetes:   No  Family History:  No    PE/DVT RISK FACTORS:  Sex:    Male  Hormones:   No  Tobacco:   No  Cancer:   No  Travel:   No  Surgery:   No  Other immobilization: No  Personal history:  No  Family history:  No    Allergies:  No known drug allergies    Medications:    The patient is not currently taking any prescribed medications.    Past Medical History:    Asthma    Past Surgical History:    Excise ganglion left wrist  Right ACL repair    Family History:    Hypertension  The patient denies any family history of early heart disease.    Social History:  Smoking status: No  Alcohol use: Yes, rarely  PCP: Brian Arriaga  Marital Status:  [2]     Review of Systems   Constitutional: Positive for fatigue. Negative for chills, diaphoresis and fever.   Respiratory: Positive for " chest tightness and shortness of breath.    Gastrointestinal: Negative for abdominal pain, nausea and vomiting.   All other systems reviewed and are negative.    Physical Exam   Patient Vitals for the past 24 hrs:   BP Temp Temp src Heart Rate Resp SpO2   08/23/18 1210 139/84 - - 73 15 99 %   08/23/18 1132 (!) 156/96 - - - - -   08/23/18 1129 - 98  F (36.7  C) Oral 82 16 99 %     Physical Exam  General: Resting on the bed.  Head: No obvious trauma to head.  Ears, Nose, Throat:  External ears normal.  Nose normal.  Eyes:  Conjunctivae clear.  Pupils are equal, round, and reactive.   Neck: Normal range of motion.  Neck supple.   CV: Regular rate and rhythm.  No murmurs.  No chest wall pain.  2+radial pulses  Respiratory: Effort normal and breath sounds normal.  No wheezing or crackles.   Gastrointestinal: Soft.  No distension. There is no tenderness.  Musculoskeletal: Non tender non edematous calves  Skin: Skin is warm and dry.  No rash noted.     Emergency Department Course   ECG (11:29:21):  Rate 73 bpm. IN interval 176. QRS duration 100. QT/QTc 406/447. P-R-T axes 39 16 32. Normal sinus rhythm. Normal ECG. Interpreted at 1217 by Samanta Fajardo MD.    ECG (12:54:38):  Rate 65 bpm. IN interval 186. QRS duration 94. QT/QTc 402/428. P-R-T axes -9 16 30. Normal sinus rhythm. Normal ECG. Interpreted at 1259 by Samanta Fajardo MD.    Imaging:  Radiographic findings were communicated with the patient who voiced understanding of the findings.    XR Chest 2 views:  Negative.  As read by Radiology.    Laboratory:  CBC: WNL (WBC 5.5, HGB 15.0, )  BMP: Calcium 8.3 (L), o/w WNL (Creatinine 0.84)  Troponin: <0.015  TSH 1.69    Emergency Department Course:  Past medical records, nursing notes, and vitals reviewed.  1204: I performed an exam of the patient and obtained history, as documented above.  ECG performed, results above.  IV inserted and blood drawn.  The patient was sent for a chest x-ray while in the  emergency department, findings above.    1305: I rechecked the patient. Explained findings to the patient.    I rechecked the patient. Findings and plan explained to the patient. Patient discharged home with instructions regarding supportive care, medications, and reasons to return. The importance of close follow-up was reviewed.     Impression & Plan    Medical Decision Making:  Shawn Martini is an otherwise healthy 33-year-old male who presents with shortness of breath and fatigue. Vital signs are unremarkable. A broad differential was pursued including, but not limited to pneumonia, asthma, pneumothorax, anemia, electrolyte/metabolic/renal dysfunction, hyper- or hypothyroidism, acute coronary syndrome, and others. Overall, the patient is well-appearing and nontoxic. I considered PE, but the patient is PERC score negative; therefore I have low suspicion for PE. I also considered dissection, but the patient has no neurologic changes and has a normal-appearing mediastinum and symmetric peripheral pulses. His chest x-ray is negative without evidence of pneumonia, pneumothorax, or effusion. CBC shows no leukocytosis or anemia. BMP shows no acute electrolyte, metabolic, or renal dysfunction. Thyroid functions are normal and not concerning for hyper- or hypothyroidism. EKG shows sinus rhythm with no acute ST-T wave changes.  Troponin is negative. He does not appear concerning for acute coronary syndrome. It is unclear what the etiology of patient's fatigue and shortness of breath is. He has clear breath sounds to auscultation with no suggestion of asthma. He is otherwise well-appearing and nontoxic. At this point, I suspect atypical chest pain. Suspect GERD as a component as well.  I advised that he follow closely with his primary care doctor and advised supportive cares. The patient was discharged in stable condition.    Diagnosis:    ICD-10-CM   1. Acute chest pain R07.9   2. SOB (shortness of breath) R06.02   3.  Fatigue, unspecified type R53.83     Disposition: Discharged to home    Discharge Medications: None    Marissa Ge  8/23/2018   [facility] EMERGENCY DEPARTMENT    I, Marissa Ge, am serving as a scribe at 12:04 PM on 8/23/2018 to document services personally performed by Samanta Fajardo MD based on my observations and the provider's statements to me.        Samanta Fajardo MD  08/23/18 1846

## 2018-08-23 NOTE — TELEPHONE ENCOUNTER
Patient calling back, he took Tums for a few days symptoms seemed to improve.   Yesterday, chest tightness resumed, he feels fatigued and short of breath.   Advised safest to go to ER for assessment. Patient agrees with plan.

## 2018-08-23 NOTE — ED AVS SNAPSHOT
[facility] Emergency Department    [address]    [facility] [address]    Phone:  [phone]    Fax:  [phone]                                       Shawn Martini   MRN: [id number]    Department:  [facility] Emergency Department   Date of Visit:  8/23/2018           After Visit Summary Signature Page     I have received my discharge instructions, and my questions have been answered. I have discussed any challenges I see with this plan with the nurse or doctor.    ..........................................................................................................................................  Patient/Patient Representative Signature      ..........................................................................................................................................  Patient Representative Print Name and Relationship to Patient    ..................................................               ................................................  Date                                            Time    ..........................................................................................................................................  Reviewed by Signature/Title    ...................................................              ..............................................  Date                                                            Time          22EPIC Rev 08/18

## 2019-11-19 ENCOUNTER — OFFICE VISIT (OUTPATIENT)
Dept: FAMILY MEDICINE | Facility: CLINIC | Age: 34
End: 2019-11-19
Payer: COMMERCIAL

## 2019-11-19 VITALS
DIASTOLIC BLOOD PRESSURE: 85 MMHG | OXYGEN SATURATION: 97 % | SYSTOLIC BLOOD PRESSURE: 133 MMHG | HEIGHT: 72 IN | BODY MASS INDEX: 35.35 KG/M2 | WEIGHT: 261 LBS | HEART RATE: 89 BPM | TEMPERATURE: 99.2 F

## 2019-11-19 DIAGNOSIS — R05.9 COUGH: Primary | ICD-10-CM

## 2019-11-19 PROCEDURE — 99213 OFFICE O/P EST LOW 20 MIN: CPT | Performed by: PHYSICIAN ASSISTANT

## 2019-11-19 RX ORDER — PREDNISONE 20 MG/1
40 TABLET ORAL DAILY
Qty: 10 TABLET | Refills: 0 | Status: SHIPPED | OUTPATIENT
Start: 2019-11-19 | End: 2019-11-24

## 2019-11-19 RX ORDER — AZITHROMYCIN 250 MG/1
TABLET, FILM COATED ORAL
Qty: 6 TABLET | Refills: 0 | Status: SHIPPED | OUTPATIENT
Start: 2019-11-19 | End: 2019-11-24

## 2019-11-19 RX ORDER — CODEINE PHOSPHATE AND GUAIFENESIN 10; 100 MG/5ML; MG/5ML
1-2 SOLUTION ORAL
Qty: 118 ML | Refills: 0 | Status: SHIPPED | OUTPATIENT
Start: 2019-11-19

## 2019-11-19 ASSESSMENT — MIFFLIN-ST. JEOR: SCORE: 2153.95

## 2019-11-19 NOTE — PROGRESS NOTES
Subjective     Shawn Martini is a 34 year old male who presents to clinic today for the following health issues:    HPI   Acute Illness   Acute illness concerns: cough  Onset: one week and a half    Fever: no     Chills/Sweats: YES- clammy and chills    Headache (location?): no     Sinus Pressure:no    Conjunctivitis:  no    Ear Pain: no    Rhinorrhea: no     Congestion: YES- chest    Sore Throat: YES- from coughing     Cough: YES-productive of clear sputum, productive of yellow sputum    Wheeze: YES    Decreased Appetite: YES    Nausea: no    Vomiting: no    Diarrhea:  no    Dysuria/Freq.: no    Fatigue/Achiness: YES    Sick/Strep Exposure: YES     Therapies Tried and outcome: none         Patient Active Problem List   Diagnosis   (none) - all problems resolved or deleted     Past Surgical History:   Procedure Laterality Date     EXCISE GANGLION WRIST Left 09/28/2016    Procedure: Excision of left volar wrist ganglion cyst. Surgeon:  Prakash Seth MD  Location: [facility]     ORTHOPEDIC SURGERY Right 2003    Right knee, ACL/R     SURGICAL HISTORY OF -  Left 2003    Left wrist, ganglion cyst excision       Social History     Tobacco Use     Smoking status: Never Smoker     Smokeless tobacco: Never Used   Substance Use Topics     Alcohol use: No     Alcohol/week: 0.0 standard drinks     Comment: rare     Family History   Problem Relation Age of Onset     Family History Negative Mother      Family History Negative Father          Current Outpatient Medications   Medication Sig Dispense Refill     Azithromycin (ZITHROMAX PO) Take 250 mg by mouth       BENZONATATE PO Take 100 mg by mouth       guaiFENesin-codeine (ROBITUSSIN AC) 100-10 MG/5ML SOLN solution Take 5-10 mLs by mouth every 6 hours as needed for cough 120 mL 0     levofloxacin (LEVAQUIN) 500 MG tablet Take 1 tablet (500 mg) by mouth daily 7 tablet 0     predniSONE (DELTASONE) 20 MG tablet Take 1 tablet (20 mg) by mouth daily 5 tablet 0      "PREDNISONE PO Take 4 mg by mouth       No Known Allergies      Reviewed and updated as needed this visit by Provider         Review of Systems   ROS COMP: Constitutional, HEENT, cardiovascular, pulmonary, gi and gu systems are negative, except as otherwise noted.        Objective    /85 (BP Location: Right arm, Patient Position: Chair, Cuff Size: Adult Large)   Pulse 89   Temp 99.2  F (37.3  C) (Oral)   Ht 1.816 m (5' 11.5\")   Wt 118.4 kg (261 lb)   SpO2 97%   BMI 35.89 kg/m    Body mass index is 35.89 kg/m .         Physical Exam   GENERAL: healthy, alert and no distress  EYES: Eyes grossly normal to inspection, PERRL and conjunctivae and sclerae normal  HENT: ear canals and TM's normal, nose and mouth without ulcers or lesions  RESP: lungs clear to auscultation - no rales, rhonchi or wheezes  CV: regular rate and rhythm, normal S1 S2, no S3 or S4, no murmur, click or rub, no peripheral edema and peripheral pulses strong  MS: no gross musculoskeletal defects noted, no edema  SKIN: no suspicious lesions or rashes  NEURO: Normal strength and tone, mentation intact and speech normal  PSYCH: mentation appears normal, affect normal/bright  LYMPH: anterior cervical: enlarged tender nodes    Diagnostic Test Results:  none         Assessment & Plan     (R05) Cough  (primary encounter diagnosis)    Comment: Start treatment with prednisone and cheratussin. If not improving as expected, start the antibiotic.    Plan: predniSONE (DELTASONE) 20 MG tablet,         azithromycin (ZITHROMAX) 250 MG tablet,         guaiFENesin-codeine (ROBITUSSIN AC) 100-10         MG/5ML solution                 Patient Instructions   Take the complete course of the steroid.    If not improving after finishing the steroid, take the antibiotic.    Follow-up if not improving in 2-3 weeks or sooner if worsening.      Use cough syrup at bedtime if needed.       No follow-ups on file.    Gianni Hua PA-C  [facility] [facility] [place]

## 2019-11-19 NOTE — PATIENT INSTRUCTIONS
Take the complete course of the steroid.    If not improving after finishing the steroid, take the antibiotic.    Follow-up if not improving in 2-3 weeks or sooner if worsening.      Use cough syrup at bedtime if needed.

## 2020-03-22 ENCOUNTER — HEALTH MAINTENANCE LETTER (OUTPATIENT)
Age: 35
End: 2020-03-22

## 2021-01-15 ENCOUNTER — HEALTH MAINTENANCE LETTER (OUTPATIENT)
Age: 36
End: 2021-01-15

## 2021-05-15 ENCOUNTER — HEALTH MAINTENANCE LETTER (OUTPATIENT)
Age: 36
End: 2021-05-15

## 2021-09-04 ENCOUNTER — HEALTH MAINTENANCE LETTER (OUTPATIENT)
Age: 36
End: 2021-09-04

## 2022-06-11 ENCOUNTER — HEALTH MAINTENANCE LETTER (OUTPATIENT)
Age: 37
End: 2022-06-11

## 2022-10-22 ENCOUNTER — HEALTH MAINTENANCE LETTER (OUTPATIENT)
Age: 37
End: 2022-10-22

## 2023-06-18 ENCOUNTER — HEALTH MAINTENANCE LETTER (OUTPATIENT)
Age: 38
End: 2023-06-18